# Patient Record
Sex: MALE | Race: WHITE | NOT HISPANIC OR LATINO | Employment: OTHER | URBAN - METROPOLITAN AREA
[De-identification: names, ages, dates, MRNs, and addresses within clinical notes are randomized per-mention and may not be internally consistent; named-entity substitution may affect disease eponyms.]

---

## 2023-10-12 DIAGNOSIS — M25.562 ACUTE PAIN OF LEFT KNEE: ICD-10-CM

## 2023-10-12 PROBLEM — M16.11 PRIMARY OSTEOARTHRITIS OF RIGHT HIP: Status: ACTIVE | Noted: 2023-10-12

## 2023-10-12 PROBLEM — M17.12 ARTHRITIS OF KNEE, LEFT: Status: ACTIVE | Noted: 2023-10-12

## 2023-10-12 PROBLEM — M25.551 RIGHT HIP PAIN: Status: ACTIVE | Noted: 2023-10-12

## 2023-10-12 RX ORDER — TRAMADOL HYDROCHLORIDE 50 MG/1
TABLET ORAL
COMMUNITY
Start: 2022-12-22 | End: 2023-12-14 | Stop reason: HOSPADM

## 2023-10-12 RX ORDER — ROSUVASTATIN CALCIUM 20 MG/1
TABLET, COATED ORAL
COMMUNITY
Start: 2023-09-01

## 2023-10-12 RX ORDER — BETAMETHASONE DIPROPIONATE 0.5 MG/G
OINTMENT TOPICAL
COMMUNITY
Start: 2019-09-17

## 2023-10-12 RX ORDER — PANTOPRAZOLE SODIUM 40 MG/1
TABLET, DELAYED RELEASE ORAL
COMMUNITY

## 2023-10-12 RX ORDER — METHYLPHENIDATE HYDROCHLORIDE 20 MG/1
TABLET ORAL
COMMUNITY
Start: 2023-09-13

## 2023-10-12 RX ORDER — HYALURONATE SODIUM 16.8MG/2ML
SYRINGE (ML) INTRAARTICULAR
COMMUNITY
Start: 2019-06-20 | End: 2023-12-14 | Stop reason: HOSPADM

## 2023-10-12 RX ORDER — DICLOFENAC SODIUM 75 MG/1
TABLET, DELAYED RELEASE ORAL
COMMUNITY
End: 2023-12-14 | Stop reason: HOSPADM

## 2023-10-12 RX ORDER — HYDROCODONE BITARTRATE AND ACETAMINOPHEN 5; 325 MG/1; MG/1
TABLET ORAL
COMMUNITY
Start: 2023-09-12 | End: 2023-12-14 | Stop reason: HOSPADM

## 2023-10-12 RX ORDER — CLOBETASOL PROPIONATE 0.5 MG/G
OINTMENT TOPICAL
COMMUNITY
Start: 2022-10-20

## 2023-10-12 RX ORDER — AMLODIPINE BESYLATE 5 MG/1
TABLET ORAL
COMMUNITY
Start: 2023-09-01

## 2023-10-12 RX ORDER — AMLODIPINE BESYLATE 2.5 MG/1
TABLET ORAL
COMMUNITY
Start: 2021-09-24 | End: 2023-12-14 | Stop reason: HOSPADM

## 2023-10-12 RX ORDER — OXYCODONE HYDROCHLORIDE 5 MG/1
TABLET ORAL
COMMUNITY
Start: 2022-12-22 | End: 2023-12-14 | Stop reason: HOSPADM

## 2023-10-12 RX ORDER — ROSUVASTATIN CALCIUM 10 MG/1
TABLET, COATED ORAL
COMMUNITY
Start: 2022-12-26 | End: 2023-12-14 | Stop reason: HOSPADM

## 2023-10-13 ENCOUNTER — ANCILLARY PROCEDURE (OUTPATIENT)
Dept: RADIOLOGY | Facility: CLINIC | Age: 68
End: 2023-10-13
Payer: COMMERCIAL

## 2023-10-13 ENCOUNTER — OFFICE VISIT (OUTPATIENT)
Dept: ORTHOPEDIC SURGERY | Facility: CLINIC | Age: 68
End: 2023-10-13
Payer: COMMERCIAL

## 2023-10-13 VITALS — BODY MASS INDEX: 32.93 KG/M2 | WEIGHT: 230 LBS | HEIGHT: 70 IN

## 2023-10-13 DIAGNOSIS — M17.12 ARTHRITIS OF LEFT KNEE: Primary | ICD-10-CM

## 2023-10-13 DIAGNOSIS — M25.562 ACUTE PAIN OF LEFT KNEE: ICD-10-CM

## 2023-10-13 DIAGNOSIS — Z01.818 PREOP TESTING: ICD-10-CM

## 2023-10-13 PROCEDURE — 73562 X-RAY EXAM OF KNEE 3: CPT | Mod: LT

## 2023-10-13 PROCEDURE — 99214 OFFICE O/P EST MOD 30 MIN: CPT | Performed by: ORTHOPAEDIC SURGERY

## 2023-10-13 PROCEDURE — 73562 X-RAY EXAM OF KNEE 3: CPT | Mod: LEFT SIDE | Performed by: RADIOLOGY

## 2023-10-13 RX ORDER — SODIUM CHLORIDE, SODIUM LACTATE, POTASSIUM CHLORIDE, CALCIUM CHLORIDE 600; 310; 30; 20 MG/100ML; MG/100ML; MG/100ML; MG/100ML
100 INJECTION, SOLUTION INTRAVENOUS CONTINUOUS
Status: CANCELLED | OUTPATIENT
Start: 2023-10-13

## 2023-10-13 NOTE — PROGRESS NOTES
LT KNEE PAIN    68-year-old is seen with left knee pain.  Has been having persistent severe sharp shooting pain in the left knee.  Has difficulty with standing and walking and going up and down stairs and getting up and down from a chair in and out of a car.  He has severe debilitating pain that he has had for many years despite extensive conservative treatment.  Including medication usage and avoidance of aggravating activities and physical therapy.    Pleasant and no acute distress. Walks with antalgic gait. Stands with varus alignment of the left knee and neutral on the right. Left knee range of motion is 5-110°. The knee is stable to varus and valgus stress Lachman and posterior drawer. There is a mild effusion. There is generalized tenderness. Right knee range of motion is 0-120°. There is no effusion. The knee is stable to varus and valgus stress Lachman and posterior drawer. Both lower extremities are well perfused the skin is intact and muscle tone is adequate.    Multiple x-ray views of the left knee are personally reviewed and these demonstrate advanced degenerative changes with complete loss of joint space and osteophyte formation and subchondral sclerosis and cystic change.    The patient has undergone extensive conservative treatment but has persistent severe debilitating pain and advanced degenerative changes on x-ray.  Treatment options including no treatment reviewed and the decision was made to proceed with left total knee arthroplasty.  The surgery and postoperative course were reviewed in detail.  Risks including but not limited to infection thromboembolus neurovascular injury fracture bleeding medical problems stiffness and implant failure or loosening were discussed and they understand this and have elected to proceed.  They will have medical clearance.  Avoidance of aggravating activities will be done in the meantime.  Intravenous TXA will be used at the time of the procedure.

## 2023-10-30 PROBLEM — M17.12 ARTHRITIS OF LEFT KNEE: Status: ACTIVE | Noted: 2023-10-13

## 2023-12-06 ENCOUNTER — PRE-ADMISSION TESTING (OUTPATIENT)
Dept: PREADMISSION TESTING | Facility: HOSPITAL | Age: 68
End: 2023-12-06
Payer: COMMERCIAL

## 2023-12-06 VITALS
TEMPERATURE: 96.8 F | DIASTOLIC BLOOD PRESSURE: 86 MMHG | RESPIRATION RATE: 20 BRPM | SYSTOLIC BLOOD PRESSURE: 143 MMHG | OXYGEN SATURATION: 97 % | HEIGHT: 70 IN | BODY MASS INDEX: 33.17 KG/M2 | WEIGHT: 231.7 LBS | HEART RATE: 63 BPM

## 2023-12-06 DIAGNOSIS — Z01.818 PREOP TESTING: Primary | ICD-10-CM

## 2023-12-06 DIAGNOSIS — M17.12 ARTHRITIS OF LEFT KNEE: ICD-10-CM

## 2023-12-06 DIAGNOSIS — M16.12 PRIMARY OSTEOARTHRITIS OF LEFT HIP: Primary | ICD-10-CM

## 2023-12-06 LAB
ABO GROUP (TYPE) IN BLOOD: NORMAL
ANION GAP SERPL CALC-SCNC: 12 MMOL/L (ref 10–20)
ANTIBODY SCREEN: NORMAL
APPEARANCE UR: CLEAR
APTT PPP: 31 SECONDS (ref 27–38)
BILIRUB UR STRIP.AUTO-MCNC: NEGATIVE MG/DL
BUN SERPL-MCNC: 16 MG/DL (ref 6–23)
CALCIUM SERPL-MCNC: 9.3 MG/DL (ref 8.6–10.3)
CHLORIDE SERPL-SCNC: 107 MMOL/L (ref 98–107)
CO2 SERPL-SCNC: 25 MMOL/L (ref 21–32)
COLOR UR: YELLOW
CREAT SERPL-MCNC: 1.06 MG/DL (ref 0.5–1.3)
ERYTHROCYTE [DISTWIDTH] IN BLOOD BY AUTOMATED COUNT: 13 % (ref 11.5–14.5)
GFR SERPL CREATININE-BSD FRML MDRD: 76 ML/MIN/1.73M*2
GLUCOSE SERPL-MCNC: 84 MG/DL (ref 74–99)
GLUCOSE UR STRIP.AUTO-MCNC: NEGATIVE MG/DL
HCT VFR BLD AUTO: 44.5 % (ref 41–52)
HGB BLD-MCNC: 15.1 G/DL (ref 13.5–17.5)
HOLD SPECIMEN: NORMAL
INR PPP: 1.1 (ref 0.9–1.1)
KETONES UR STRIP.AUTO-MCNC: NEGATIVE MG/DL
LEUKOCYTE ESTERASE UR QL STRIP.AUTO: NEGATIVE
MCH RBC QN AUTO: 30.5 PG (ref 26–34)
MCHC RBC AUTO-ENTMCNC: 33.9 G/DL (ref 32–36)
MCV RBC AUTO: 90 FL (ref 80–100)
NITRITE UR QL STRIP.AUTO: NEGATIVE
NRBC BLD-RTO: 0 /100 WBCS (ref 0–0)
PH UR STRIP.AUTO: 5 [PH]
PLATELET # BLD AUTO: 216 X10*3/UL (ref 150–450)
POTASSIUM SERPL-SCNC: 4.1 MMOL/L (ref 3.5–5.3)
PROT UR STRIP.AUTO-MCNC: NEGATIVE MG/DL
PROTHROMBIN TIME: 11.9 SECONDS (ref 9.8–12.8)
RBC # BLD AUTO: 4.95 X10*6/UL (ref 4.5–5.9)
RBC # UR STRIP.AUTO: NEGATIVE /UL
RH FACTOR (ANTIGEN D): NORMAL
SODIUM SERPL-SCNC: 140 MMOL/L (ref 136–145)
SP GR UR STRIP.AUTO: 1.02
UROBILINOGEN UR STRIP.AUTO-MCNC: <2 MG/DL
WBC # BLD AUTO: 6.2 X10*3/UL (ref 4.4–11.3)

## 2023-12-06 PROCEDURE — 85027 COMPLETE CBC AUTOMATED: CPT

## 2023-12-06 PROCEDURE — 87081 CULTURE SCREEN ONLY: CPT | Mod: PARLAB

## 2023-12-06 PROCEDURE — 36415 COLL VENOUS BLD VENIPUNCTURE: CPT

## 2023-12-06 PROCEDURE — 82565 ASSAY OF CREATININE: CPT

## 2023-12-06 PROCEDURE — 86900 BLOOD TYPING SEROLOGIC ABO: CPT

## 2023-12-06 PROCEDURE — 93005 ELECTROCARDIOGRAM TRACING: CPT

## 2023-12-06 PROCEDURE — 93010 ELECTROCARDIOGRAM REPORT: CPT | Performed by: INTERNAL MEDICINE

## 2023-12-06 PROCEDURE — 99203 OFFICE O/P NEW LOW 30 MIN: CPT | Performed by: NURSE PRACTITIONER

## 2023-12-06 PROCEDURE — 81003 URINALYSIS AUTO W/O SCOPE: CPT

## 2023-12-06 PROCEDURE — 85610 PROTHROMBIN TIME: CPT

## 2023-12-06 ASSESSMENT — PAIN - FUNCTIONAL ASSESSMENT: PAIN_FUNCTIONAL_ASSESSMENT: 0-10

## 2023-12-06 ASSESSMENT — DUKE ACTIVITY SCORE INDEX (DASI)
CAN YOU WALK INDOORS, SUCH AS AROUND YOUR HOUSE: YES
CAN YOU DO HEAVY WORK AROUND THE HOUSE LIKE SCRUBBING FLOORS OR LIFTING AND MOVING HEAVY FURNITURE: YES
CAN YOU HAVE SEXUAL RELATIONS: YES
CAN YOU CLIMB A FLIGHT OF STAIRS OR WALK UP A HILL: YES
CAN YOU RUN A SHORT DISTANCE: NO
CAN YOU WALK A BLOCK OR TWO ON LEVEL GROUND: YES
DASI METS SCORE: 8.9
CAN YOU DO MODERATE WORK AROUND THE HOUSE LIKE VACUUMING, SWEEPING FLOORS OR CARRYING GROCERIES: YES
TOTAL_SCORE: 50.2
CAN YOU DO YARD WORK LIKE RAKING LEAVES, WEEDING OR PUSHING A MOWER: YES
CAN YOU PARTICIPATE IN MODERATE RECREATIONAL ACTIVITIES LIKE GOLF, BOWLING, DANCING, DOUBLES TENNIS OR THROWING A BASEBALL OR FOOTBALL: YES
CAN YOU PARTICIPATE IN STRENOUS SPORTS LIKE SWIMMING, SINGLES TENNIS, FOOTBALL, BASKETBALL, OR SKIING: YES
CAN YOU TAKE CARE OF YOURSELF (EAT, DRESS, BATHE, OR USE TOILET): YES
CAN YOU DO LIGHT WORK AROUND THE HOUSE LIKE DUSTING OR WASHING DISHES: YES

## 2023-12-06 NOTE — PREPROCEDURE INSTRUCTIONS
Medication List            Accurate as of December 6, 2023  2:07 PM. Always use your most recent med list.                * amLODIPine 2.5 mg tablet  Commonly known as: Norvasc  Medication Adjustments for Surgery: Take morning of surgery with sip of water, no other fluids     * amLODIPine 5 mg tablet  Commonly known as: Norvasc     betamethasone dipropionate 0.05 % ointment  Commonly known as: Diprosone  Medication Adjustments for Surgery: Continue until night before surgery     clobetasol 0.05 % ointment  Commonly known as: Temovate  Medication Adjustments for Surgery: Continue until night before surgery     diclofenac 75 mg EC tablet  Commonly known as: Voltaren  Medication Adjustments for Surgery: Stop 7 days before surgery     Gelsyn-3 16.8 mg/2 mL injection  Generic drug: sodium hyaluronate     HYDROcodone-acetaminophen 5-325 mg tablet  Commonly known as: Norco  Medication Adjustments for Surgery: Continue until night before surgery     methylphenidate 20 mg tablet  Commonly known as: Ritalin  Medication Adjustments for Surgery: Continue until night before surgery     oxyCODONE 5 mg immediate release tablet  Commonly known as: Roxicodone     pantoprazole 40 mg EC tablet  Commonly known as: ProtoNix  Medication Adjustments for Surgery: Take morning of surgery with sip of water, no other fluids     rivaroxaban 10 mg tablet  Commonly known as: Xarelto  Take 1 tablet (10 mg) by mouth once daily for 12 days. To begin after surgery on 12/14  Medication Adjustments for Surgery: Other (Comment)  Notes to patient: Not taking     * rosuvastatin 10 mg tablet  Commonly known as: Crestor     * rosuvastatin 20 mg tablet  Commonly known as: Crestor  Medication Adjustments for Surgery: Continue until night before surgery     traMADol 50 mg tablet  Commonly known as: Ultram           * This list has 4 medication(s) that are the same as other medications prescribed for you. Read the directions carefully, and ask your doctor  or other care provider to review them with you.                                  NPO Instructions:    Do not eat any food after midnight the night before your surgery/procedure.    Additional Instructions:     Day of Surgery:  Review your medication instructions, take indicated medications  Wear  comfortable loose fitting clothing  Do not use moisturizers, creams, lotions or perfume    PRE-OPERATIVE INSTRUCTIONS FOR SURGERY    *Do not eat anything after midnight the night of surgery.  This includes food of any kind (including hard candy, cough drops, mints and gum) and beverages (including coffee and soda).  You may drink up to one 8 ounce glass of water up until three hours before your scheduled surgery time.    *One of our staff members will call you ONE business day before your surgery, between 11a-2p  To let you know the time to arrive.  If you have no received a call by 2 pm, call [phone]  *When you arrive at the hospital-->GO TO Registration on the ground floor  *Stop smoking 24 hours prior to surgery.  No Marijuana, CBD Oil or Vaping for 48 hours  *No alcohol 24 hours prior to surgery  *You will need a responsible adult to drive you home  -No acrylic nails or nail polish on at least one fingernail, NO polish on toes for foot surgery  -You may be asked to remove your dentures, partial plate, eyeglasses or contact lenses before going to surgery.  Please bring a case for these items.  -Body piercings need to be removed.  Jewelry and valuables should be left at home.  -Put on loose,  comfortable, clean clothing, that will accommodate bandages    HOME PREOPERATIVE ANTIBACTERIAL SHOWER:  -This shower is a way of cleaning the skin with germ killing solution before surgery.  The solution contains Chorhexidine (CHG).   Let your Dr. Know if you are allergic to Chlorhexidine.    NIGHT BEFORE SURGERY:  IF you are having a TOTAL joint replacement- YOU WILL SHOWER 2 NIGHTS PRIOR to surgery    -Take a normal shower and  wash your hair  -Turn OFF water to avoid rinsing the antibacterial skin cleanser off (CHG).  -Apply the CHG cleanser to a clean and wet washcloth.  Lather your entire body from the neck down.    -DO NOT USE ON THE HEAD, FACE, or GENITALS.  -RINSE immediately if CHG is in contact with your eyes, ears or mouth  -Gently wash your body.  Have the CHG cleanser stay on your skin for 3 MINUTES.  -After 3 minutes, turn on water and rinse the CHG cleanser off your body completely  -DO NOT WASH with regular soap after using CHG.  -PAT DRY with a clean fresh towel  -DO NOT apply any estrella, deodorants or lotions  -Dress in clean night cloths  **CHG cleanser will cause stains to fabrics if you wash them with bleach after use.     DAY OF SURGERY:  -Take shower-->JUST GET WET.  Turn OFF water.  -REPEAT the CHG cleanse as you did the night before.  -PAT DRY-->    What you may be asked to bring to surgery:  ___Crutches, walker  ___CPAP machine  ___Urine specimen

## 2023-12-06 NOTE — CPM/PAT H&P
"CPM/PAT Evaluation       Name: Maverick Foote (Maverick Foote)  /Age: 1955/68 y.o.     In-Person       Chief Complaint: Left knee OA    68 yr old male with c/o Left knee OA.  Reports ongoing progressive pain worsened by activity including walking, standing and stairs.  Pain is constant ache w/intermittent stiffness, knee instability, adding \"knee locks up, pops out\"; denies falls.  Has tried injections and physical therapy with no lasting relief.  Accompanied by spouse Gudelia who is attentive and supportive.  Reports prior Right knee replacement  and successful Right hip replacement December of last year. Reports remaining otherwise physically active, denies cardiac or respiratory symptoms.  Denies past issues with anesthesia.        No past medical history on file.    No past surgical history on file.    Patient  has no history on file for sexual activity.    Family History   Problem Relation Name Age of Onset    Cancer Mother         No Known Allergies    Prior to Admission medications    Medication Sig Start Date End Date Taking? Authorizing Provider   amLODIPine (Norvasc) 2.5 mg tablet TAKE 1 TABLET BY MOUTH EVERY DAY 21   Historical Provider, MD   amLODIPine (Norvasc) 5 mg tablet TAKE 1 TABLET BY MOUTH EVERY DAY 23   Historical Provider, MD   betamethasone dipropionate (Diprolene) 0.05 % ointment 1 Application 19   Historical Provider, MD   clobetasol (Temovate) 0.05 % ointment  10/20/22   Historical Provider, MD   diclofenac (Voltaren) 75 mg EC tablet TAKE 1 TABLET BY MOUTH TWICE DAILY WITH MEALS AS NEEDED    Historical Provider, MD   HYDROcodone-acetaminophen (Norco) 5-325 mg tablet TAKE 1 TABLET BY MOUTH EVERY DAY AS NEEDED FOR PAIN 23   Historical Provider, MD   methylphenidate (Ritalin) 20 mg tablet  23   Historical Provider, MD   oxyCODONE (Roxicodone) 5 mg immediate release tablet TAKE 1 -2 TABLETS BY MOUTH EVERY 4 -6 HOURS FOR 7 DAYS AS NEEDED FOR SEVERE PAIN. " 12/22/22   Historical Provider, MD   pantoprazole (ProtoNix) 40 mg EC tablet TAKE 1 TABLET BY MOUTH EVERY DAY    Historical Provider, MD   rivaroxaban (Xarelto) 10 mg tablet Take 1 tablet (10 mg) by mouth once daily for 12 days. To begin after surgery on 12/14 12/6/23 12/18/23  Mariluz Avalos APRN-CNS   rosuvastatin (Crestor) 10 mg tablet TAKE 2 TABLETS BY MOUTH DAILY AT BEDTIME 12/26/22   Historical Provider, MD   rosuvastatin (Crestor) 20 mg tablet TAKE 1 TABLET BY MOUTH DAILY AT BEDTIME 9/1/23   Historical Provider, MD   sodium hyaluronate (Gelsyn-3) 16.8 mg/2 mL injection INJECT ONE PREFILLED SYRINGE INTRA-ARTICULARLY INTO LEFT KNEE WEEKLY FOR THREE WEEKS, QTY 3 SYRINGES 6/20/19   Historical Provider, MD   traMADol (Ultram) 50 mg tablet TAKE 1 TABLET BY MOUTH EVERY 4 TO 6 HOURS FOR 7 DAYS AS NEEDED FOR MODERATE PAIN 12/22/22   Historical Provider, MD        Review of Systems    Constitutional: no fever, no chills and not feeling poorly.   Eyes: no eyesight problems.   ENT: no hearing loss, no nosebleeds and no sore throat.   Cardiovascular: no chest pain, no palpitations and no extremity edema.   Respiratory: no shortness of breath, no wheezing, no cough, reports sob with exertion.   Gastrointestinal: negative for abdominal pain, blood in stools or changes in bowel habits   Genitourinary: negative for dysuria, incontinence or changes in urinary habits   Musculoskeletal: no arthralgias and no gait abnormality.   Integumentary: negative for lesions, rash or itching.   Neurological: negative for confusion, dizziness, fainting or difficulty walking.   Psychiatric: not suicidal, no anxiety and no depression.   All other systems have been reviewed and are negative for complaint.     Physical Exam    General:      No acute distress, AOx3; appropriate mood and eye contact  HENT:      Head normal cephalic; mucous membranes moist & pink  Eyes:      Vision grossly intact, PERRLA, corrective lenses in use  Skin:      Warm,  dry & intact, no erythema or rashes noted  Respiratory:      Symmetric chest expansion, CTA, Room air  Cardiovascular:      RRR, S1, S2, no murmurs, rubs or gallops  Abdomen:      Soft, non-tender, BS+  Extremities:      No gross deformities; MORTENSEN x4; mild unsteady gait  Psychiatric:      Pleasant & cooperative, appropriate affect    PAT AIRWAY:   Airway:     Mallampati::  II    TM distance::  <3 FB    Neck ROM::  Full   upper dentures      Visit Vitals  /86   Pulse 63   Temp 36 °C (96.8 °F) (Temporal)   Resp 20       DASI Risk Score      Flowsheet Row Most Recent Value   DASI SCORE 50.2   METS Score (Will be calculated only when all the questions are answered) 8.9          Caprini DVT Assessment    No data to display       Modified Frailty Index    No data to display       CHADS2 Stroke Risk  Current as of 7 minutes ago        N/A 3 - 100%: High Risk   2 - 3%: Medium Risk   0 - 2%: Low Risk     Last Change: N/A          This score determines the patient's risk of having a stroke if the patient has atrial fibrillation.        This score is not applicable to this patient. Components are not calculated.          Revised Cardiac Risk Index    No data to display       Apfel Simplified Score    No data to display       Risk Analysis Index Results This Encounter    No data found in the last 1 encounters.       Stop Bang Score      Flowsheet Row Most Recent Value   Do you snore loudly? 0   Do you often feel tired or fatigued after your sleep? 0   Has anyone ever observed you stop breathing in your sleep? 0   Do you have or are you being treated for high blood pressure? 1   Recent BMI (Calculated) 33   Is BMI greater than 35 kg/m2? 0=No   Age older than 50 years old? 1=Yes   Is your neck circumference greater than 17 inches (Male) or 16 inches (Female)? 0   Gender - Male 1=Yes   STOP-BANG Total Score 3            Assessment and Plan:     Followed by ortho, Left knee OA    Medical clearance provided (Jani  12/4/2023)    Left total knee replacement w/Dr Ocampo on 12/13/2023

## 2023-12-07 LAB
BB ANTIBODY IDENTIFICATION: NORMAL
CASE #: NORMAL

## 2023-12-08 LAB — STAPHYLOCOCCUS SPEC CULT: NORMAL

## 2023-12-09 LAB
ATRIAL RATE: 67 BPM
P AXIS: 73 DEGREES
P OFFSET: 200 MS
P ONSET: 145 MS
PR INTERVAL: 154 MS
Q ONSET: 222 MS
QRS COUNT: 11 BEATS
QRS DURATION: 108 MS
QT INTERVAL: 398 MS
QTC CALCULATION(BAZETT): 420 MS
QTC FREDERICIA: 413 MS
R AXIS: 21 DEGREES
T AXIS: 20 DEGREES
T OFFSET: 421 MS
VENTRICULAR RATE: 67 BPM

## 2023-12-13 ENCOUNTER — APPOINTMENT (OUTPATIENT)
Dept: RADIOLOGY | Facility: HOSPITAL | Age: 68
End: 2023-12-13
Payer: COMMERCIAL

## 2023-12-13 ENCOUNTER — HOSPITAL ENCOUNTER (OUTPATIENT)
Facility: HOSPITAL | Age: 68
Discharge: HOME HEALTH | End: 2023-12-14
Attending: ORTHOPAEDIC SURGERY | Admitting: ORTHOPAEDIC SURGERY
Payer: COMMERCIAL

## 2023-12-13 ENCOUNTER — ANESTHESIA (OUTPATIENT)
Dept: OPERATING ROOM | Facility: HOSPITAL | Age: 68
End: 2023-12-13
Payer: COMMERCIAL

## 2023-12-13 ENCOUNTER — ANESTHESIA EVENT (OUTPATIENT)
Dept: OPERATING ROOM | Facility: HOSPITAL | Age: 68
End: 2023-12-13
Payer: COMMERCIAL

## 2023-12-13 DIAGNOSIS — M17.12 ARTHRITIS OF LEFT KNEE: ICD-10-CM

## 2023-12-13 PROBLEM — E78.5 HYPERLIPIDEMIA: Status: ACTIVE | Noted: 2023-12-13

## 2023-12-13 PROBLEM — I10 HTN (HYPERTENSION): Status: ACTIVE | Noted: 2023-12-13

## 2023-12-13 PROCEDURE — 2500000001 HC RX 250 WO HCPCS SELF ADMINISTERED DRUGS (ALT 637 FOR MEDICARE OP)

## 2023-12-13 PROCEDURE — C1776 JOINT DEVICE (IMPLANTABLE): HCPCS | Performed by: ORTHOPAEDIC SURGERY

## 2023-12-13 PROCEDURE — 2500000004 HC RX 250 GENERAL PHARMACY W/ HCPCS (ALT 636 FOR OP/ED): Performed by: ORTHOPAEDIC SURGERY

## 2023-12-13 PROCEDURE — 73560 X-RAY EXAM OF KNEE 1 OR 2: CPT | Mod: LEFT SIDE | Performed by: RADIOLOGY

## 2023-12-13 PROCEDURE — 27447 TOTAL KNEE ARTHROPLASTY: CPT

## 2023-12-13 PROCEDURE — C1713 ANCHOR/SCREW BN/BN,TIS/BN: HCPCS | Performed by: ORTHOPAEDIC SURGERY

## 2023-12-13 PROCEDURE — 2500000004 HC RX 250 GENERAL PHARMACY W/ HCPCS (ALT 636 FOR OP/ED): Performed by: NURSE ANESTHETIST, CERTIFIED REGISTERED

## 2023-12-13 PROCEDURE — 76942 ECHO GUIDE FOR BIOPSY: CPT | Performed by: ANESTHESIOLOGY

## 2023-12-13 PROCEDURE — 94760 N-INVAS EAR/PLS OXIMETRY 1: CPT

## 2023-12-13 PROCEDURE — 3600000005 HC OR TIME - INITIAL BASE CHARGE - PROCEDURE LEVEL FIVE: Performed by: ORTHOPAEDIC SURGERY

## 2023-12-13 PROCEDURE — 3700000001 HC GENERAL ANESTHESIA TIME - INITIAL BASE CHARGE: Performed by: ORTHOPAEDIC SURGERY

## 2023-12-13 PROCEDURE — 2500000005 HC RX 250 GENERAL PHARMACY W/O HCPCS: Performed by: ORTHOPAEDIC SURGERY

## 2023-12-13 PROCEDURE — 2720000007 HC OR 272 NO HCPCS: Performed by: ORTHOPAEDIC SURGERY

## 2023-12-13 PROCEDURE — A27447 PR TOTAL KNEE ARTHROPLASTY: Performed by: ANESTHESIOLOGY

## 2023-12-13 PROCEDURE — 7100000011 HC EXTENDED STAY RECOVERY HOURLY - NURSING UNIT

## 2023-12-13 PROCEDURE — 73560 X-RAY EXAM OF KNEE 1 OR 2: CPT | Mod: LT,FY

## 2023-12-13 PROCEDURE — 88311 DECALCIFY TISSUE: CPT | Performed by: STUDENT IN AN ORGANIZED HEALTH CARE EDUCATION/TRAINING PROGRAM

## 2023-12-13 PROCEDURE — 88305 TISSUE EXAM BY PATHOLOGIST: CPT | Performed by: STUDENT IN AN ORGANIZED HEALTH CARE EDUCATION/TRAINING PROGRAM

## 2023-12-13 PROCEDURE — 2500000004 HC RX 250 GENERAL PHARMACY W/ HCPCS (ALT 636 FOR OP/ED): Performed by: ANESTHESIOLOGY

## 2023-12-13 PROCEDURE — 27447 TOTAL KNEE ARTHROPLASTY: CPT | Performed by: ORTHOPAEDIC SURGERY

## 2023-12-13 PROCEDURE — 3600000010 HC OR TIME - EACH INCREMENTAL 1 MINUTE - PROCEDURE LEVEL FIVE: Performed by: ORTHOPAEDIC SURGERY

## 2023-12-13 PROCEDURE — 2500000004 HC RX 250 GENERAL PHARMACY W/ HCPCS (ALT 636 FOR OP/ED)

## 2023-12-13 PROCEDURE — 7100000002 HC RECOVERY ROOM TIME - EACH INCREMENTAL 1 MINUTE: Performed by: ORTHOPAEDIC SURGERY

## 2023-12-13 PROCEDURE — 2780000003 HC OR 278 NO HCPCS: Performed by: ORTHOPAEDIC SURGERY

## 2023-12-13 PROCEDURE — 7100000001 HC RECOVERY ROOM TIME - INITIAL BASE CHARGE: Performed by: ORTHOPAEDIC SURGERY

## 2023-12-13 PROCEDURE — 88311 DECALCIFY TISSUE: CPT | Mod: TC,SUR,PARLAB | Performed by: ORTHOPAEDIC SURGERY

## 2023-12-13 PROCEDURE — 88305 TISSUE EXAM BY PATHOLOGIST: CPT | Mod: TC,SUR,PARLAB | Performed by: ORTHOPAEDIC SURGERY

## 2023-12-13 PROCEDURE — 2500000001 HC RX 250 WO HCPCS SELF ADMINISTERED DRUGS (ALT 637 FOR MEDICARE OP): Performed by: ORTHOPAEDIC SURGERY

## 2023-12-13 PROCEDURE — A27447 PR TOTAL KNEE ARTHROPLASTY: Performed by: NURSE ANESTHETIST, CERTIFIED REGISTERED

## 2023-12-13 PROCEDURE — 3700000002 HC GENERAL ANESTHESIA TIME - EACH INCREMENTAL 1 MINUTE: Performed by: ORTHOPAEDIC SURGERY

## 2023-12-13 DEVICE — ATTUNE PATELLA MEDIALIZED DOME 35MM CEMENTED AOX
Type: IMPLANTABLE DEVICE | Site: KNEE | Status: FUNCTIONAL
Brand: ATTUNE

## 2023-12-13 DEVICE — ATTUNE KNEE SYSTEM TIBIAL INSERT FIXED BEARING CRUCIATE RETAINING 8 5MM AOX
Type: IMPLANTABLE DEVICE | Site: KNEE | Status: FUNCTIONAL
Brand: ATTUNE

## 2023-12-13 DEVICE — IMPLANTABLE DEVICE
Type: IMPLANTABLE DEVICE | Site: KNEE | Status: FUNCTIONAL
Brand: BIOMET® BONE CEMENT R

## 2023-12-13 DEVICE — ATTUNE KNEE SYSTEM FEMORAL CRUCIATE RETAINING SIZE 8 LEFT CEMENTED
Type: IMPLANTABLE DEVICE | Site: KNEE | Status: FUNCTIONAL
Brand: ATTUNE

## 2023-12-13 DEVICE — TIBAL BASE ATTUNE FB, SZ 7 CEM: Type: IMPLANTABLE DEVICE | Site: KNEE | Status: FUNCTIONAL

## 2023-12-13 RX ORDER — PROPOFOL 10 MG/ML
INJECTION, EMULSION INTRAVENOUS CONTINUOUS PRN
Status: DISCONTINUED | OUTPATIENT
Start: 2023-12-13 | End: 2023-12-13

## 2023-12-13 RX ORDER — SODIUM CHLORIDE, SODIUM LACTATE, POTASSIUM CHLORIDE, CALCIUM CHLORIDE 600; 310; 30; 20 MG/100ML; MG/100ML; MG/100ML; MG/100ML
100 INJECTION, SOLUTION INTRAVENOUS CONTINUOUS
Status: DISCONTINUED | OUTPATIENT
Start: 2023-12-13 | End: 2023-12-13 | Stop reason: HOSPADM

## 2023-12-13 RX ORDER — TRANEXAMIC ACID 10 MG/ML
1000 INJECTION, SOLUTION INTRAVENOUS
Status: COMPLETED | OUTPATIENT
Start: 2023-12-13 | End: 2023-12-13

## 2023-12-13 RX ORDER — SODIUM CHLORIDE, SODIUM LACTATE, POTASSIUM CHLORIDE, CALCIUM CHLORIDE 600; 310; 30; 20 MG/100ML; MG/100ML; MG/100ML; MG/100ML
100 INJECTION, SOLUTION INTRAVENOUS CONTINUOUS
Status: DISCONTINUED | OUTPATIENT
Start: 2023-12-13 | End: 2023-12-14 | Stop reason: HOSPADM

## 2023-12-13 RX ORDER — GABAPENTIN 300 MG/1
CAPSULE ORAL
Status: COMPLETED
Start: 2023-12-13 | End: 2023-12-13

## 2023-12-13 RX ORDER — PROMETHAZINE HYDROCHLORIDE 25 MG/ML
12.5 INJECTION, SOLUTION INTRAMUSCULAR; INTRAVENOUS ONCE AS NEEDED
Status: DISCONTINUED | OUTPATIENT
Start: 2023-12-13 | End: 2023-12-13 | Stop reason: HOSPADM

## 2023-12-13 RX ORDER — DIPHENHYDRAMINE HCL 12.5MG/5ML
12.5 LIQUID (ML) ORAL EVERY 6 HOURS PRN
Status: DISCONTINUED | OUTPATIENT
Start: 2023-12-13 | End: 2023-12-14 | Stop reason: HOSPADM

## 2023-12-13 RX ORDER — MIDAZOLAM HYDROCHLORIDE 1 MG/ML
INJECTION, SOLUTION INTRAMUSCULAR; INTRAVENOUS AS NEEDED
Status: DISCONTINUED | OUTPATIENT
Start: 2023-12-13 | End: 2023-12-13

## 2023-12-13 RX ORDER — NALOXONE HYDROCHLORIDE 1 MG/ML
0.2 INJECTION INTRAMUSCULAR; INTRAVENOUS; SUBCUTANEOUS EVERY 5 MIN PRN
Status: DISCONTINUED | OUTPATIENT
Start: 2023-12-13 | End: 2023-12-14 | Stop reason: HOSPADM

## 2023-12-13 RX ORDER — LABETALOL HYDROCHLORIDE 5 MG/ML
5 INJECTION, SOLUTION INTRAVENOUS ONCE AS NEEDED
Status: DISCONTINUED | OUTPATIENT
Start: 2023-12-13 | End: 2023-12-13 | Stop reason: HOSPADM

## 2023-12-13 RX ORDER — PROPOFOL 10 MG/ML
INJECTION, EMULSION INTRAVENOUS AS NEEDED
Status: DISCONTINUED | OUTPATIENT
Start: 2023-12-13 | End: 2023-12-13

## 2023-12-13 RX ORDER — MIDAZOLAM HYDROCHLORIDE 1 MG/ML
INJECTION, SOLUTION INTRAMUSCULAR; INTRAVENOUS
Status: DISPENSED
Start: 2023-12-13 | End: 2023-12-14

## 2023-12-13 RX ORDER — DOCUSATE SODIUM 100 MG/1
100 CAPSULE, LIQUID FILLED ORAL 2 TIMES DAILY
Status: DISCONTINUED | OUTPATIENT
Start: 2023-12-13 | End: 2023-12-14 | Stop reason: HOSPADM

## 2023-12-13 RX ORDER — MIDAZOLAM HYDROCHLORIDE 1 MG/ML
1 INJECTION, SOLUTION INTRAMUSCULAR; INTRAVENOUS ONCE AS NEEDED
Status: DISCONTINUED | OUTPATIENT
Start: 2023-12-13 | End: 2023-12-13 | Stop reason: HOSPADM

## 2023-12-13 RX ORDER — PHENYLEPHRINE HCL IN 0.9% NACL 1 MG/10 ML
SYRINGE (ML) INTRAVENOUS AS NEEDED
Status: DISCONTINUED | OUTPATIENT
Start: 2023-12-13 | End: 2023-12-13

## 2023-12-13 RX ORDER — PROMETHAZINE HYDROCHLORIDE 25 MG/1
25 TABLET ORAL EVERY 6 HOURS PRN
Status: DISCONTINUED | OUTPATIENT
Start: 2023-12-13 | End: 2023-12-14 | Stop reason: HOSPADM

## 2023-12-13 RX ORDER — DIPHENHYDRAMINE HYDROCHLORIDE 50 MG/ML
12.5 INJECTION INTRAMUSCULAR; INTRAVENOUS ONCE AS NEEDED
Status: DISCONTINUED | OUTPATIENT
Start: 2023-12-13 | End: 2023-12-13 | Stop reason: HOSPADM

## 2023-12-13 RX ORDER — CEFAZOLIN SODIUM 2 G/100ML
2 INJECTION, SOLUTION INTRAVENOUS EVERY 8 HOURS
Status: COMPLETED | OUTPATIENT
Start: 2023-12-13 | End: 2023-12-14

## 2023-12-13 RX ORDER — MEPERIDINE HYDROCHLORIDE 25 MG/ML
12.5 INJECTION INTRAMUSCULAR; INTRAVENOUS; SUBCUTANEOUS EVERY 10 MIN PRN
Status: DISCONTINUED | OUTPATIENT
Start: 2023-12-13 | End: 2023-12-13 | Stop reason: HOSPADM

## 2023-12-13 RX ORDER — CEFAZOLIN SODIUM 2 G/100ML
INJECTION, SOLUTION INTRAVENOUS
Status: COMPLETED
Start: 2023-12-13 | End: 2023-12-13

## 2023-12-13 RX ORDER — ACETAMINOPHEN 325 MG/1
650 TABLET ORAL ONCE
Status: COMPLETED | OUTPATIENT
Start: 2023-12-13 | End: 2023-12-13

## 2023-12-13 RX ORDER — OXYCODONE HYDROCHLORIDE 5 MG/1
2.5 TABLET ORAL EVERY 4 HOURS PRN
Status: DISCONTINUED | OUTPATIENT
Start: 2023-12-13 | End: 2023-12-14 | Stop reason: HOSPADM

## 2023-12-13 RX ORDER — CEFAZOLIN SODIUM 2 G/100ML
2 INJECTION, SOLUTION INTRAVENOUS EVERY 8 HOURS
Status: DISCONTINUED | OUTPATIENT
Start: 2023-12-13 | End: 2023-12-13 | Stop reason: HOSPADM

## 2023-12-13 RX ORDER — ACETAMINOPHEN 325 MG/1
650 TABLET ORAL EVERY 6 HOURS
Status: DISCONTINUED | OUTPATIENT
Start: 2023-12-13 | End: 2023-12-14 | Stop reason: HOSPADM

## 2023-12-13 RX ORDER — OXYCODONE HYDROCHLORIDE 5 MG/1
5 TABLET ORAL EVERY 6 HOURS PRN
Status: DISCONTINUED | OUTPATIENT
Start: 2023-12-13 | End: 2023-12-14 | Stop reason: HOSPADM

## 2023-12-13 RX ORDER — CYCLOBENZAPRINE HCL 10 MG
10 TABLET ORAL 3 TIMES DAILY PRN
Status: DISCONTINUED | OUTPATIENT
Start: 2023-12-13 | End: 2023-12-14 | Stop reason: HOSPADM

## 2023-12-13 RX ORDER — ONDANSETRON HYDROCHLORIDE 2 MG/ML
4 INJECTION, SOLUTION INTRAVENOUS ONCE AS NEEDED
Status: DISCONTINUED | OUTPATIENT
Start: 2023-12-13 | End: 2023-12-13 | Stop reason: HOSPADM

## 2023-12-13 RX ORDER — ONDANSETRON 4 MG/1
4 TABLET, FILM COATED ORAL EVERY 8 HOURS PRN
Status: DISCONTINUED | OUTPATIENT
Start: 2023-12-13 | End: 2023-12-14 | Stop reason: HOSPADM

## 2023-12-13 RX ORDER — KETOROLAC TROMETHAMINE 30 MG/ML
15 INJECTION, SOLUTION INTRAMUSCULAR; INTRAVENOUS EVERY 6 HOURS PRN
Status: DISCONTINUED | OUTPATIENT
Start: 2023-12-13 | End: 2023-12-14 | Stop reason: HOSPADM

## 2023-12-13 RX ORDER — ONDANSETRON HYDROCHLORIDE 2 MG/ML
4 INJECTION, SOLUTION INTRAVENOUS EVERY 8 HOURS PRN
Status: DISCONTINUED | OUTPATIENT
Start: 2023-12-13 | End: 2023-12-14 | Stop reason: HOSPADM

## 2023-12-13 RX ORDER — PROMETHAZINE HYDROCHLORIDE 25 MG/1
25 SUPPOSITORY RECTAL EVERY 12 HOURS PRN
Status: DISCONTINUED | OUTPATIENT
Start: 2023-12-13 | End: 2023-12-14 | Stop reason: HOSPADM

## 2023-12-13 RX ORDER — HYDROMORPHONE HYDROCHLORIDE 1 MG/ML
1 INJECTION, SOLUTION INTRAMUSCULAR; INTRAVENOUS; SUBCUTANEOUS EVERY 5 MIN PRN
Status: DISCONTINUED | OUTPATIENT
Start: 2023-12-13 | End: 2023-12-13 | Stop reason: HOSPADM

## 2023-12-13 RX ORDER — ACETAMINOPHEN 325 MG/1
TABLET ORAL
Status: COMPLETED
Start: 2023-12-13 | End: 2023-12-13

## 2023-12-13 RX ORDER — POLYETHYLENE GLYCOL 3350 17 G/17G
17 POWDER, FOR SOLUTION ORAL DAILY
Status: DISCONTINUED | OUTPATIENT
Start: 2023-12-14 | End: 2023-12-14 | Stop reason: HOSPADM

## 2023-12-13 RX ORDER — HYDRALAZINE HYDROCHLORIDE 20 MG/ML
5 INJECTION INTRAMUSCULAR; INTRAVENOUS EVERY 30 MIN PRN
Status: DISCONTINUED | OUTPATIENT
Start: 2023-12-13 | End: 2023-12-13 | Stop reason: HOSPADM

## 2023-12-13 RX ORDER — FENTANYL CITRATE 50 UG/ML
INJECTION, SOLUTION INTRAMUSCULAR; INTRAVENOUS
Status: DISPENSED
Start: 2023-12-13 | End: 2023-12-14

## 2023-12-13 RX ORDER — ACETAMINOPHEN 325 MG/1
650 TABLET ORAL EVERY 4 HOURS PRN
Status: DISCONTINUED | OUTPATIENT
Start: 2023-12-13 | End: 2023-12-13 | Stop reason: HOSPADM

## 2023-12-13 RX ORDER — GABAPENTIN 300 MG/1
300 CAPSULE ORAL ONCE
Status: COMPLETED | OUTPATIENT
Start: 2023-12-13 | End: 2023-12-13

## 2023-12-13 RX ORDER — ACETAMINOPHEN 325 MG/1
650 TABLET ORAL EVERY 6 HOURS SCHEDULED
Status: DISCONTINUED | OUTPATIENT
Start: 2023-12-14 | End: 2023-12-13

## 2023-12-13 RX ORDER — OXYCODONE HYDROCHLORIDE 5 MG/1
10 TABLET ORAL EVERY 4 HOURS PRN
Status: DISCONTINUED | OUTPATIENT
Start: 2023-12-13 | End: 2023-12-14 | Stop reason: HOSPADM

## 2023-12-13 RX ADMIN — Medication 100 MCG: at 15:07

## 2023-12-13 RX ADMIN — PROPOFOL 100 MCG/KG/MIN: 10 INJECTION, EMULSION INTRAVENOUS at 14:10

## 2023-12-13 RX ADMIN — Medication 100 MCG: at 14:26

## 2023-12-13 RX ADMIN — Medication 100 MCG: at 14:55

## 2023-12-13 RX ADMIN — MEPERIDINE HYDROCHLORIDE 12.5 MG: 25 INJECTION INTRAMUSCULAR; INTRAVENOUS; SUBCUTANEOUS at 16:55

## 2023-12-13 RX ADMIN — TRANEXAMIC ACID 1000 MG: 10 INJECTION, SOLUTION INTRAVENOUS at 15:49

## 2023-12-13 RX ADMIN — DOCUSATE SODIUM 100 MG: 100 CAPSULE, LIQUID FILLED ORAL at 22:55

## 2023-12-13 RX ADMIN — Medication 150 MCG: at 14:43

## 2023-12-13 RX ADMIN — Medication 100 MCG: at 14:34

## 2023-12-13 RX ADMIN — GABAPENTIN 300 MG: 300 CAPSULE ORAL at 11:26

## 2023-12-13 RX ADMIN — Medication 200 MCG: at 16:01

## 2023-12-13 RX ADMIN — OXYCODONE HYDROCHLORIDE 10 MG: 5 TABLET ORAL at 21:33

## 2023-12-13 RX ADMIN — PROPOFOL 50 MG: 10 INJECTION, EMULSION INTRAVENOUS at 14:15

## 2023-12-13 RX ADMIN — CEFAZOLIN SODIUM 2 G: 2 INJECTION, SOLUTION INTRAVENOUS at 14:12

## 2023-12-13 RX ADMIN — MEPERIDINE HYDROCHLORIDE 12.5 MG: 25 INJECTION INTRAMUSCULAR; INTRAVENOUS; SUBCUTANEOUS at 17:07

## 2023-12-13 RX ADMIN — MIDAZOLAM 2 MG: 1 INJECTION INTRAMUSCULAR; INTRAVENOUS at 14:00

## 2023-12-13 RX ADMIN — SODIUM CHLORIDE, SODIUM LACTATE, POTASSIUM CHLORIDE, AND CALCIUM CHLORIDE 100 ML/HR: 600; 310; 30; 20 INJECTION, SOLUTION INTRAVENOUS at 11:27

## 2023-12-13 RX ADMIN — SODIUM CHLORIDE, POTASSIUM CHLORIDE, SODIUM LACTATE AND CALCIUM CHLORIDE 100 ML/HR: 600; 310; 30; 20 INJECTION, SOLUTION INTRAVENOUS at 22:55

## 2023-12-13 RX ADMIN — CEFAZOLIN SODIUM 2 G: 2 INJECTION, SOLUTION INTRAVENOUS at 22:53

## 2023-12-13 RX ADMIN — Medication 150 MCG: at 14:46

## 2023-12-13 RX ADMIN — TRANEXAMIC ACID 1000 MG: 10 INJECTION, SOLUTION INTRAVENOUS at 14:13

## 2023-12-13 RX ADMIN — Medication 2 L/MIN: at 21:15

## 2023-12-13 RX ADMIN — SODIUM CHLORIDE, POTASSIUM CHLORIDE, SODIUM LACTATE AND CALCIUM CHLORIDE 100 ML/HR: 600; 310; 30; 20 INJECTION, SOLUTION INTRAVENOUS at 16:45

## 2023-12-13 RX ADMIN — Medication 100 MCG: at 14:39

## 2023-12-13 RX ADMIN — Medication 100 MCG: at 15:52

## 2023-12-13 RX ADMIN — HYDROMORPHONE HYDROCHLORIDE 0.5 MG: 1 INJECTION, SOLUTION INTRAMUSCULAR; INTRAVENOUS; SUBCUTANEOUS at 22:58

## 2023-12-13 RX ADMIN — Medication 150 MCG: at 15:28

## 2023-12-13 RX ADMIN — ONDANSETRON 4 MG: 2 INJECTION INTRAMUSCULAR; INTRAVENOUS at 21:33

## 2023-12-13 RX ADMIN — Medication 150 MCG: at 15:40

## 2023-12-13 RX ADMIN — Medication 100 MCG: at 14:57

## 2023-12-13 RX ADMIN — SODIUM CHLORIDE, SODIUM LACTATE, POTASSIUM CHLORIDE, AND CALCIUM CHLORIDE: 600; 310; 30; 20 INJECTION, SOLUTION INTRAVENOUS at 14:49

## 2023-12-13 RX ADMIN — ACETAMINOPHEN 650 MG: 325 TABLET ORAL at 22:54

## 2023-12-13 RX ADMIN — HYDROMORPHONE HYDROCHLORIDE 1 MG: 1 INJECTION, SOLUTION INTRAMUSCULAR; INTRAVENOUS; SUBCUTANEOUS at 18:23

## 2023-12-13 RX ADMIN — ACETAMINOPHEN 650 MG: 325 TABLET ORAL at 11:25

## 2023-12-13 RX ADMIN — Medication 100 MCG: at 14:31

## 2023-12-13 RX ADMIN — Medication 100 MCG: at 15:19

## 2023-12-13 RX ADMIN — Medication 100 MCG: at 15:48

## 2023-12-13 SDOH — SOCIAL STABILITY: SOCIAL INSECURITY: ARE THERE ANY APPARENT SIGNS OF INJURIES/BEHAVIORS THAT COULD BE RELATED TO ABUSE/NEGLECT?: NO

## 2023-12-13 SDOH — SOCIAL STABILITY: SOCIAL INSECURITY: HAS ANYONE EVER THREATENED TO HURT YOUR FAMILY OR YOUR PETS?: NO

## 2023-12-13 SDOH — SOCIAL STABILITY: SOCIAL INSECURITY: DO YOU FEEL UNSAFE GOING BACK TO THE PLACE WHERE YOU ARE LIVING?: NO

## 2023-12-13 SDOH — SOCIAL STABILITY: SOCIAL INSECURITY: WERE YOU ABLE TO COMPLETE ALL THE BEHAVIORAL HEALTH SCREENINGS?: YES

## 2023-12-13 SDOH — SOCIAL STABILITY: SOCIAL INSECURITY: DO YOU FEEL ANYONE HAS EXPLOITED OR TAKEN ADVANTAGE OF YOU FINANCIALLY OR OF YOUR PERSONAL PROPERTY?: NO

## 2023-12-13 SDOH — SOCIAL STABILITY: SOCIAL INSECURITY: HAVE YOU HAD THOUGHTS OF HARMING ANYONE ELSE?: NO

## 2023-12-13 SDOH — SOCIAL STABILITY: SOCIAL INSECURITY: ARE YOU OR HAVE YOU BEEN THREATENED OR ABUSED PHYSICALLY, EMOTIONALLY, OR SEXUALLY BY ANYONE?: NO

## 2023-12-13 SDOH — HEALTH STABILITY: MENTAL HEALTH: CURRENT SMOKER: 0

## 2023-12-13 SDOH — SOCIAL STABILITY: SOCIAL INSECURITY: ABUSE: ADULT

## 2023-12-13 SDOH — SOCIAL STABILITY: SOCIAL INSECURITY: DOES ANYONE TRY TO KEEP YOU FROM HAVING/CONTACTING OTHER FRIENDS OR DOING THINGS OUTSIDE YOUR HOME?: NO

## 2023-12-13 ASSESSMENT — COLUMBIA-SUICIDE SEVERITY RATING SCALE - C-SSRS
6. HAVE YOU EVER DONE ANYTHING, STARTED TO DO ANYTHING, OR PREPARED TO DO ANYTHING TO END YOUR LIFE?: NO
1. IN THE PAST MONTH, HAVE YOU WISHED YOU WERE DEAD OR WISHED YOU COULD GO TO SLEEP AND NOT WAKE UP?: NO
2. HAVE YOU ACTUALLY HAD ANY THOUGHTS OF KILLING YOURSELF?: NO

## 2023-12-13 ASSESSMENT — ACTIVITIES OF DAILY LIVING (ADL)
LACK_OF_TRANSPORTATION: NO
FEEDING YOURSELF: INDEPENDENT
JUDGMENT_ADEQUATE_SAFELY_COMPLETE_DAILY_ACTIVITIES: YES
TOILETING: NEEDS ASSISTANCE
BATHING: INDEPENDENT
DRESSING YOURSELF: INDEPENDENT
HEARING - RIGHT EAR: FUNCTIONAL
ASSISTIVE_DEVICE: WALKER
HEARING - LEFT EAR: FUNCTIONAL
ADEQUATE_TO_COMPLETE_ADL: YES
PATIENT'S MEMORY ADEQUATE TO SAFELY COMPLETE DAILY ACTIVITIES?: YES
WALKS IN HOME: NEEDS ASSISTANCE
GROOMING: INDEPENDENT

## 2023-12-13 ASSESSMENT — PAIN SCALES - GENERAL
PAINLEVEL_OUTOF10: 9
PAINLEVEL_OUTOF10: 0 - NO PAIN
PAINLEVEL_OUTOF10: 7
PAINLEVEL_OUTOF10: 0 - NO PAIN
PAIN_LEVEL: 0
PAINLEVEL_OUTOF10: 6
PAINLEVEL_OUTOF10: 0 - NO PAIN
PAINLEVEL_OUTOF10: 8
PAINLEVEL_OUTOF10: 0 - NO PAIN

## 2023-12-13 ASSESSMENT — PAIN - FUNCTIONAL ASSESSMENT
PAIN_FUNCTIONAL_ASSESSMENT: 0-10

## 2023-12-13 ASSESSMENT — COGNITIVE AND FUNCTIONAL STATUS - GENERAL
HELP NEEDED FOR BATHING: A LITTLE
CLIMB 3 TO 5 STEPS WITH RAILING: A LITTLE
DRESSING REGULAR LOWER BODY CLOTHING: A LITTLE
TURNING FROM BACK TO SIDE WHILE IN FLAT BAD: A LITTLE
MOBILITY SCORE: 18
TOILETING: A LITTLE
DAILY ACTIVITIY SCORE: 21
STANDING UP FROM CHAIR USING ARMS: A LITTLE
PATIENT BASELINE BEDBOUND: NO
MOVING TO AND FROM BED TO CHAIR: A LITTLE
WALKING IN HOSPITAL ROOM: A LITTLE
MOVING FROM LYING ON BACK TO SITTING ON SIDE OF FLAT BED WITH BEDRAILS: A LITTLE

## 2023-12-13 ASSESSMENT — PATIENT HEALTH QUESTIONNAIRE - PHQ9
1. LITTLE INTEREST OR PLEASURE IN DOING THINGS: NOT AT ALL
SUM OF ALL RESPONSES TO PHQ9 QUESTIONS 1 & 2: 0
2. FEELING DOWN, DEPRESSED OR HOPELESS: NOT AT ALL

## 2023-12-13 ASSESSMENT — LIFESTYLE VARIABLES
AUDIT-C TOTAL SCORE: 0
AUDIT-C TOTAL SCORE: 0
SKIP TO QUESTIONS 9-10: 1
HOW OFTEN DO YOU HAVE A DRINK CONTAINING ALCOHOL: NEVER
HOW OFTEN DO YOU HAVE 6 OR MORE DRINKS ON ONE OCCASION: NEVER
HOW MANY STANDARD DRINKS CONTAINING ALCOHOL DO YOU HAVE ON A TYPICAL DAY: PATIENT DOES NOT DRINK

## 2023-12-13 ASSESSMENT — PAIN DESCRIPTION - LOCATION: LOCATION: KNEE

## 2023-12-13 ASSESSMENT — PAIN DESCRIPTION - ORIENTATION: ORIENTATION: LEFT

## 2023-12-13 NOTE — ANESTHESIA POSTPROCEDURE EVALUATION
Patient: Maverick Foote    Procedure Summary       Date: 12/13/23 Room / Location: [facility] 07 / [facility]    Anesthesia Start: 1358 Anesthesia Stop: 1639    Procedure: LEFT TOTAL KNEE REPLACEMENT (Left: Knee) Diagnosis:       Arthritis of left knee      (Arthritis of left knee [M17.12])    Surgeons: Perry Ocampo MD Responsible Provider: Fer Madrid MD    Anesthesia Type: regional, spinal ASA Status: 2            Anesthesia Type: regional, spinal    Vitals Value Taken Time   /80 12/13/23 1638   Temp 36.3 °C (97.3 °F) 12/13/23 1638   Pulse 72 12/13/23 1638   Resp 16 12/13/23 1638   SpO2 97 % 12/13/23 1638       Anesthesia Post Evaluation    Patient location during evaluation: PACU  Patient participation: complete - patient participated  Level of consciousness: awake  Pain score: 0  Pain management: adequate  Airway patency: patent  Cardiovascular status: acceptable  Respiratory status: acceptable  Hydration status: acceptable  Postoperative Nausea and Vomiting: none        There were no known notable events for this encounter.

## 2023-12-13 NOTE — OP NOTE
LEFT TOTAL KNEE REPLACEMENT (L) Operative Note     Date: 2023  OR Location: [facility]    Name: Maverick Foote, : 1955, Age: 68 y.o., MRN: [id number], Sex: male    Diagnosis  Pre-op Diagnosis     * Arthritis of left knee [M17.12] Post-op Diagnosis     * Arthritis of left knee [M17.12]     Procedures  LEFT TOTAL KNEE REPLACEMENT  62464 - [facility] ARTHRP KNE CONDYLE&PLATU MEDIAL&LAT COMPARTMENTS  DePuy attune femur size 8, tibia size 7, 5 mm polyethylene, 35 mm patella    Surgeons      * Perry Ocampo - Primary    Resident/Fellow/Other Assistant:  Surgeon(s) and Role: Haley Hendrix CNP, RNFA, Edward Gawryszewski    Procedure Summary  Anesthesia: Consult  ASA: II  Anesthesia Staff: Anesthesiologist: Fer Madrid MD  CRNA: MELLY Allen-CRNA  C-AA: SONIA Sanabria  Estimated Blood Loss: 25mL    Indications:  Patient has undergone extensive conservative treatment, but has peristant severe sharp shooting pain and difficulty with standing and walking.  Radiographs demonstrate severe degenerative changes with loss of joint space and osteophyte formation and subchondral sclerosis and cystic change.  Treatment options including no treatment were reviewed and the decision was made to proceed with surgery.    FARAZ Dc CNP was required as a surgical assistant to provide retraction for adequate and safe exposure, to assist with securing cutting guides as they were held in place by operating surgeon, to assist in patient leg positioning throughout the procedure, assist with trial implant placement and trial reduction and range of motion testing, assist with final implant placement and assist with wound closure.  She was involved in the performance of the entire procedure.  No qualified resident was available.    Description of procedure: The patient was brought into the operating room.  Anesthesia was performed.  The patient was positioned and prepped and draped in the usual fashion.  After  Esmarch exsanguination the tourniquet was inflated.  A longitudinal midline incision was made.  Medial parapatellar arthrotomy was performed.  Medial periosteal release was done.  There was advanced degenerative disease involving the knee.  Remaining medial and lateral menisci were removed.  ACL was removed.  Notch osteophytes were removed.  Entry into the femoral canal was done and the intramedullary guide was placed.  The distal femoral cut was made.  The femur was sized.  The cutting block was applied and then the cuts were made.  Peripheral osteophytes were removed.  Attention was turned to the tibia and the external alignment guide was used and the tibial cut was made.  Posterior osteophytes were removed.  Trialing was then done and the implants were selected.  The decision was made to resurface the patella and with an oscillating saw the patellar cut was made to remove a similar amount of bone as the patellar implant.  The patellar lug drills were drilled and the patella was trialed and tracked well.  The femoral lug drills were drilled and tibia was prepared with the appropriate punches.  The bone cuts were pulse irrigated and then well dried.  The tibial implant was cemented.  The polyethylene was impacted on and then the femoral implant was cemented followed by the patellar implant.  Extruding cement was removed and the cement was allowed to harden.  The wound was copiously irrigated with antibiotic irrigation.  Hemostasis was carefully obtained.  The knee was stable throughout a full range of motion and the patella tracked well.  The arthrotomy was closed with #1 Vicryl and flexion against gravity was 120 degrees following closure.  The remainder of the wound was closed in layers and a sterile dressing was applied.  The patient tolerated the procedure well and was taken to the recovery room in stable condition.  Estimated blood loss was 25 cc.  The bone was sent for specimen.  There were no complications.   The patient received the appropriate preoperative antibiotic within 1 hour of the skin incision and antibiotics were ordered postoperatively to be completed within 24 hours.  DVT prophylaxis was ordered to start within 24 hours.  The patient received 1 g of tranexamic acid intravenous at the start and at the end of the procedure.    Intra-op Medications:   Medication Name Total Dose   ceFAZolin in dextrose (iso-os) (Ancef) IVPB 2 g 2 g   lactated Ringer's infusion 18.33 mL   tranexamic acid in NaCl,iso-os 1,000 mg/100 mL (10 mg/mL) IV 1,000 mg 1,000 mg   ceFAZolin in dextrose (iso-os) (Ancef) IVPB  - Omnicell Override Pull Cannot be calculated              Anesthesia Record               Intraprocedure I/O Totals          Intake    Propofol Drip 0.00 mL    The total shown is the total volume documented since Anesthesia Start was filed.    lactated Ringer's infusion 1000.00 mL    ceFAZolin in dextrose (iso-os) (Ancef) IVPB 2 g 100.00 mL    tranexamic acid in NaCl,iso-os 1,000 mg/100 mL (10 mg/mL) IV 1,000 mg 200.00 mL    Total Intake 1300 mL          Specimen:   ID Type Source Tests Collected by Time   1 : Left Knee Bone and Tissue Tissue KNEE ARTHROPLASTY LEFT SURGICAL PATHOLOGY EXAM Perry Ocampo MD 12/13/2023 1494        Staff:   Circulator: Lana Forbes RN  Relief Circulator: Sheri Crespo RN  Relief Scrub: Angelica Aranda  Scrub Person: Bria Lopez    Tourniquet Times:     Total Tourniquet Time Documented:  Thigh (Left) - 74 minutes  Total: Thigh (Left) - 74 minutes      Implants:  Implants       Type Name Action Serial No.      Joint Knee CEMENT, BONE, BIOMET R, 1X40 - [id number] Implanted      Joint Knee CEMENT, BONE, BIOMET R, 1X40 - [id number] Implanted      Joint Knee TIBAL BASE ATTUNE FB, SZ 7 MARTY - [id number] Implanted      Joint Knee INSERT, ATTUNE CR FB, SZ 8, 5MM - [id number] Implanted      Joint Knee FEMORAL, ATTUNE CR, MARTY, SZ 8, LT - [id number] Implanted      Joint Knee DOME, PATELLA, MEDIALIZED, 35MM -  [id number] Implanted               Attending Attestation: I performed the procedure.    Perry Ocampo  Phone Number: [phone]

## 2023-12-13 NOTE — ANESTHESIA PROCEDURE NOTES
Spinal Block    Patient location during procedure: OR  Start time: 12/13/2023 2:04 PM  End time: 12/13/2023 2:08 PM  Reason for block: primary anesthetic  Staffing  Performed: CRNA and attending   Authorized by: Humberto Serra MD    Performed by: MAINE Allen    Preanesthetic Checklist  Completed: patient identified, IV checked, site marked, risks and benefits discussed, surgical consent, monitors and equipment checked, pre-op evaluation, timeout performed and sterile techniques followed  Block Timeout  RN/Licensed healthcare professional reads aloud to the Anesthesia provider and entire team: Patient identity, procedure with side and site, patient position, and as applicable the availability of implants/special equipment/special requirements.  Patient on coagulant treatment: no  Timeout performed at: 12/13/2023 2:03 PM  Spinal Block  Patient position: sitting  Prep: Betadine  Sterility prep: cap, drape, gloves and mask  Sedation level: light sedation  Patient monitoring: blood pressure, continuous pulse oximetry and heart rate  Approach: midline  Vertebral space: L2-3  Injection technique: single-shot  Needle  Needle type: Elv   Needle gauge: 22 G  Needle length: 3.5 in  Free flowing CSF: yes    Assessment  Sensory level: T6 bilateral  Block outcome: patient comfortable  Procedure assessment: patient tolerated procedure well with no immediate complications  Additional Notes  Attempt x2 by CRNA, attempt x2 by MD. Pt tolerated well.

## 2023-12-13 NOTE — ANESTHESIA PREPROCEDURE EVALUATION
Patient: Maverick Foote    Procedure Information       Date/Time: 12/13/23 1230    Procedure: LEFT TOTAL KNEE REPLACEMENT (Left: Knee)    Location: [facility] / [facility]    Surgeons: Perry Ocampo MD            Relevant Problems   Anesthesia (within normal limits)      Cardiovascular   (+) HTN (hypertension)   (+) Hyperlipidemia      Musculoskeletal   (+) Primary osteoarthritis of right hip      Other   (+) Arthritis of knee, left   (+) Arthritis of left knee       Clinical information reviewed:   Tobacco  Allergies  Meds   Med Hx  Surg Hx   Fam Hx          NPO Detail:  NPO/Void Status  Date of Last Liquid: 12/12/23  Time of Last Liquid: 2300  Date of Last Solid: 12/12/23  Time of Last Solid: 2000         Physical Exam    Airway  Mallampati: II  TM distance: >3 FB  Neck ROM: full     Cardiovascular - normal exam     Dental   (+) upper dentures     Pulmonary - normal exam     Abdominal            Anesthesia Plan    ASA 2     regional and spinal     The patient is not a current smoker.    intravenous induction   Anesthetic plan and risks discussed with patient.    Plan discussed with CRNA.

## 2023-12-13 NOTE — ANESTHESIA PROCEDURE NOTES
Peripheral Block    Patient location during procedure: pre-op  Start time: 12/13/2023 12:25 PM  End time: 12/13/2023 12:35 PM  Reason for block: at surgeon's request and post-op pain management  Staffing  Performed: attending   Authorized by: Humberto Serra MD    Performed by: Humberto Serra MD  Preanesthetic Checklist  Completed: patient identified, IV checked, site marked, risks and benefits discussed, surgical consent, monitors and equipment checked, pre-op evaluation and timeout performed   Timeout performed at: 12/13/2023 12:25 PM  Peripheral Block  Patient position: laying flat  Prep: ChloraPrep  Patient monitoring: heart rate, cardiac monitor and continuous pulse ox  Block type: adductor canal  Injection technique: single-shot  Guidance: ultrasound guided  Local infiltration: ropivacaine  Infiltration strength: 0.5 %  Dose: 30 mL  Needle  Needle gauge: 20 G  Needle localization: ultrasound guidance  Test dose: negative  Assessment  Injection assessment: negative aspiration for heme, no paresthesia on injection, incremental injection and local visualized surrounding nerve on ultrasound  Paresthesia pain: none  Heart rate change: no  Slow fractionated injection: yes

## 2023-12-14 ENCOUNTER — HOME HEALTH ADMISSION (OUTPATIENT)
Dept: HOME HEALTH SERVICES | Facility: HOME HEALTH | Age: 68
End: 2023-12-14
Payer: COMMERCIAL

## 2023-12-14 VITALS
HEIGHT: 70 IN | TEMPERATURE: 100 F | WEIGHT: 231.48 LBS | RESPIRATION RATE: 18 BRPM | OXYGEN SATURATION: 93 % | HEART RATE: 95 BPM | SYSTOLIC BLOOD PRESSURE: 135 MMHG | BODY MASS INDEX: 33.14 KG/M2 | DIASTOLIC BLOOD PRESSURE: 76 MMHG

## 2023-12-14 LAB
ANION GAP SERPL CALC-SCNC: 14 MMOL/L (ref 10–20)
BUN SERPL-MCNC: 12 MG/DL (ref 6–23)
CALCIUM SERPL-MCNC: 8.6 MG/DL (ref 8.6–10.3)
CHLORIDE SERPL-SCNC: 100 MMOL/L (ref 98–107)
CO2 SERPL-SCNC: 26 MMOL/L (ref 21–32)
CREAT SERPL-MCNC: 0.88 MG/DL (ref 0.5–1.3)
ERYTHROCYTE [DISTWIDTH] IN BLOOD BY AUTOMATED COUNT: 12.6 % (ref 11.5–14.5)
GFR SERPL CREATININE-BSD FRML MDRD: >90 ML/MIN/1.73M*2
GLUCOSE SERPL-MCNC: 139 MG/DL (ref 74–99)
HCT VFR BLD AUTO: 40.3 % (ref 41–52)
HGB BLD-MCNC: 13.4 G/DL (ref 13.5–17.5)
MCH RBC QN AUTO: 30.6 PG (ref 26–34)
MCHC RBC AUTO-ENTMCNC: 33.3 G/DL (ref 32–36)
MCV RBC AUTO: 92 FL (ref 80–100)
NRBC BLD-RTO: 0 /100 WBCS (ref 0–0)
PLATELET # BLD AUTO: 244 X10*3/UL (ref 150–450)
POTASSIUM SERPL-SCNC: 3.8 MMOL/L (ref 3.5–5.3)
RBC # BLD AUTO: 4.38 X10*6/UL (ref 4.5–5.9)
SODIUM SERPL-SCNC: 136 MMOL/L (ref 136–145)
WBC # BLD AUTO: 7.4 X10*3/UL (ref 4.4–11.3)

## 2023-12-14 PROCEDURE — 2500000001 HC RX 250 WO HCPCS SELF ADMINISTERED DRUGS (ALT 637 FOR MEDICARE OP): Performed by: CLINICAL NURSE SPECIALIST

## 2023-12-14 PROCEDURE — 97116 GAIT TRAINING THERAPY: CPT | Mod: CQ,GP

## 2023-12-14 PROCEDURE — 97535 SELF CARE MNGMENT TRAINING: CPT | Mod: CQ,GP

## 2023-12-14 PROCEDURE — 36415 COLL VENOUS BLD VENIPUNCTURE: CPT | Performed by: ORTHOPAEDIC SURGERY

## 2023-12-14 PROCEDURE — 99221 1ST HOSP IP/OBS SF/LOW 40: CPT | Performed by: NURSE PRACTITIONER

## 2023-12-14 PROCEDURE — 85027 COMPLETE CBC AUTOMATED: CPT | Performed by: ORTHOPAEDIC SURGERY

## 2023-12-14 PROCEDURE — 97165 OT EVAL LOW COMPLEX 30 MIN: CPT | Mod: GO

## 2023-12-14 PROCEDURE — 97535 SELF CARE MNGMENT TRAINING: CPT | Mod: CO,GO

## 2023-12-14 PROCEDURE — 97161 PT EVAL LOW COMPLEX 20 MIN: CPT | Mod: GP

## 2023-12-14 PROCEDURE — 99231 SBSQ HOSP IP/OBS SF/LOW 25: CPT | Performed by: CLINICAL NURSE SPECIALIST

## 2023-12-14 PROCEDURE — 2500000001 HC RX 250 WO HCPCS SELF ADMINISTERED DRUGS (ALT 637 FOR MEDICARE OP): Performed by: ORTHOPAEDIC SURGERY

## 2023-12-14 PROCEDURE — 2500000004 HC RX 250 GENERAL PHARMACY W/ HCPCS (ALT 636 FOR OP/ED): Performed by: ORTHOPAEDIC SURGERY

## 2023-12-14 PROCEDURE — 7100000011 HC EXTENDED STAY RECOVERY HOURLY - NURSING UNIT

## 2023-12-14 PROCEDURE — 97110 THERAPEUTIC EXERCISES: CPT | Mod: CQ,GP

## 2023-12-14 PROCEDURE — 80048 BASIC METABOLIC PNL TOTAL CA: CPT | Performed by: ORTHOPAEDIC SURGERY

## 2023-12-14 PROCEDURE — 2500000001 HC RX 250 WO HCPCS SELF ADMINISTERED DRUGS (ALT 637 FOR MEDICARE OP): Performed by: NURSE PRACTITIONER

## 2023-12-14 RX ORDER — TRAMADOL HYDROCHLORIDE 50 MG/1
50 TABLET ORAL EVERY 6 HOURS PRN
Qty: 28 TABLET | Refills: 0 | Status: SHIPPED | OUTPATIENT
Start: 2023-12-14 | End: 2023-12-21

## 2023-12-14 RX ORDER — POLYETHYLENE GLYCOL 3350 17 G/17G
17 POWDER, FOR SOLUTION ORAL DAILY PRN
Start: 2023-12-14

## 2023-12-14 RX ORDER — AMLODIPINE BESYLATE 5 MG/1
5 TABLET ORAL DAILY
Status: SHIPPED | OUTPATIENT
Start: 2023-12-14

## 2023-12-14 RX ORDER — ACETAMINOPHEN 325 MG/1
1000 TABLET ORAL EVERY 8 HOURS
Start: 2023-12-14

## 2023-12-14 RX ORDER — METHYLPHENIDATE HYDROCHLORIDE 5 MG/1
20 TABLET ORAL
Status: DISCONTINUED | OUTPATIENT
Start: 2023-12-14 | End: 2023-12-14 | Stop reason: HOSPADM

## 2023-12-14 RX ORDER — TRAMADOL HYDROCHLORIDE 50 MG/1
50 TABLET ORAL EVERY 6 HOURS
Status: DISCONTINUED | OUTPATIENT
Start: 2023-12-14 | End: 2023-12-14 | Stop reason: HOSPADM

## 2023-12-14 RX ORDER — DOCUSATE SODIUM 100 MG/1
100 CAPSULE, LIQUID FILLED ORAL DAILY
Start: 2023-12-14

## 2023-12-14 RX ORDER — ROSUVASTATIN CALCIUM 10 MG/1
20 TABLET, COATED ORAL NIGHTLY
Status: DISCONTINUED | OUTPATIENT
Start: 2023-12-14 | End: 2023-12-14 | Stop reason: HOSPADM

## 2023-12-14 RX ORDER — AMLODIPINE BESYLATE 5 MG/1
5 TABLET ORAL DAILY
Status: DISCONTINUED | OUTPATIENT
Start: 2023-12-14 | End: 2023-12-14 | Stop reason: HOSPADM

## 2023-12-14 RX ORDER — OXYCODONE HYDROCHLORIDE 5 MG/1
5 TABLET ORAL EVERY 6 HOURS PRN
Qty: 28 TABLET | Refills: 0 | Status: SHIPPED | OUTPATIENT
Start: 2023-12-14 | End: 2023-12-21

## 2023-12-14 RX ORDER — PANTOPRAZOLE SODIUM 40 MG/1
40 TABLET, DELAYED RELEASE ORAL DAILY
Status: DISCONTINUED | OUTPATIENT
Start: 2023-12-14 | End: 2023-12-14 | Stop reason: HOSPADM

## 2023-12-14 RX ADMIN — ACETAMINOPHEN 650 MG: 325 TABLET ORAL at 04:06

## 2023-12-14 RX ADMIN — CEFAZOLIN SODIUM 2 G: 2 INJECTION, SOLUTION INTRAVENOUS at 04:06

## 2023-12-14 RX ADMIN — TRAMADOL HYDROCHLORIDE 50 MG: 50 TABLET, COATED ORAL at 09:38

## 2023-12-14 RX ADMIN — RIVAROXABAN 10 MG: 10 TABLET, FILM COATED ORAL at 09:38

## 2023-12-14 RX ADMIN — POLYETHYLENE GLYCOL 3350 17 G: 17 POWDER, FOR SOLUTION ORAL at 09:39

## 2023-12-14 RX ADMIN — AMLODIPINE BESYLATE 5 MG: 5 TABLET ORAL at 09:38

## 2023-12-14 RX ADMIN — DOCUSATE SODIUM 100 MG: 100 CAPSULE, LIQUID FILLED ORAL at 09:38

## 2023-12-14 RX ADMIN — PANTOPRAZOLE SODIUM 40 MG: 40 TABLET, DELAYED RELEASE ORAL at 09:38

## 2023-12-14 RX ADMIN — OXYCODONE HYDROCHLORIDE 10 MG: 5 TABLET ORAL at 04:05

## 2023-12-14 RX ADMIN — CYCLOBENZAPRINE 10 MG: 10 TABLET, FILM COATED ORAL at 04:06

## 2023-12-14 RX ADMIN — HYDROMORPHONE HYDROCHLORIDE 0.5 MG: 1 INJECTION, SOLUTION INTRAMUSCULAR; INTRAVENOUS; SUBCUTANEOUS at 05:31

## 2023-12-14 RX ADMIN — ACETAMINOPHEN 650 MG: 325 TABLET ORAL at 10:31

## 2023-12-14 RX ADMIN — OXYCODONE HYDROCHLORIDE 10 MG: 5 TABLET ORAL at 10:30

## 2023-12-14 ASSESSMENT — COGNITIVE AND FUNCTIONAL STATUS - GENERAL
HELP NEEDED FOR BATHING: A LITTLE
STANDING UP FROM CHAIR USING ARMS: A LITTLE
WALKING IN HOSPITAL ROOM: A LITTLE
MOVING FROM LYING ON BACK TO SITTING ON SIDE OF FLAT BED WITH BEDRAILS: A LITTLE
STANDING UP FROM CHAIR USING ARMS: A LITTLE
TOILETING: A LOT
DAILY ACTIVITIY SCORE: 21
TOILETING: A LITTLE
DRESSING REGULAR LOWER BODY CLOTHING: A LOT
MOBILITY SCORE: 18
HELP NEEDED FOR BATHING: A LITTLE
TURNING FROM BACK TO SIDE WHILE IN FLAT BAD: A LITTLE
MOBILITY SCORE: 20
MOBILITY SCORE: 18
MOVING TO AND FROM BED TO CHAIR: A LITTLE
WALKING IN HOSPITAL ROOM: A LITTLE
WALKING IN HOSPITAL ROOM: A LITTLE
DAILY ACTIVITIY SCORE: 17
CLIMB 3 TO 5 STEPS WITH RAILING: A LITTLE
MOVING FROM LYING ON BACK TO SITTING ON SIDE OF FLAT BED WITH BEDRAILS: A LITTLE
TOILETING: A LITTLE
MOVING TO AND FROM BED TO CHAIR: A LITTLE
TURNING FROM BACK TO SIDE WHILE IN FLAT BAD: A LITTLE
DRESSING REGULAR UPPER BODY CLOTHING: A LITTLE
STANDING UP FROM CHAIR USING ARMS: A LITTLE
DAILY ACTIVITIY SCORE: 21
HELP NEEDED FOR BATHING: A LOT
DRESSING REGULAR LOWER BODY CLOTHING: A LITTLE
MOVING TO AND FROM BED TO CHAIR: A LITTLE
DRESSING REGULAR LOWER BODY CLOTHING: A LITTLE

## 2023-12-14 ASSESSMENT — ACTIVITIES OF DAILY LIVING (ADL): HOME_MANAGEMENT_TIME_ENTRY: 69

## 2023-12-14 ASSESSMENT — PAIN DESCRIPTION - ORIENTATION
ORIENTATION: LEFT
ORIENTATION: RIGHT

## 2023-12-14 ASSESSMENT — PAIN SCALES - GENERAL
PAINLEVEL_OUTOF10: 3
PAINLEVEL_OUTOF10: 10 - WORST POSSIBLE PAIN
PAINLEVEL_OUTOF10: 3
PAINLEVEL_OUTOF10: 6
PAINLEVEL_OUTOF10: 7
PAINLEVEL_OUTOF10: 8
PAINLEVEL_OUTOF10: 5 - MODERATE PAIN
PAINLEVEL_OUTOF10: 7

## 2023-12-14 ASSESSMENT — PAIN - FUNCTIONAL ASSESSMENT
PAIN_FUNCTIONAL_ASSESSMENT: 0-10

## 2023-12-14 ASSESSMENT — PAIN DESCRIPTION - LOCATION
LOCATION: KNEE
LOCATION: KNEE

## 2023-12-14 NOTE — DISCHARGE INSTRUCTIONS
PAIN MANAGEMENT AT HOME:  To provide the best pain control over the next few days when pain will be at it's worst, we suggest you continue to take the following medications routinely and separately to provide the best pain management.  In addition, apply ice VERY FREQUENTLY to incision.   Also use some type of alternative intervention such as music therapy, relaxation techniques, or an type of diversion (such as watching television or talking to a friend) to help control pain.         ROUTINE MEDICATIONS:            TAKE TRAMADOL EVERY 6 HOURS               TAKE TYLENOL EVERY 8 HOURS     In between, take oxycodone as needed for breakthrough pain.  DO NOT TAKE OXYCODONE AND TRAMADOL AT SAME TIME!!!!    Also, do NOT take more than 4000mg of acetaminophen (tylenol) in 24 hours.        CONSTIPATION MANAGEMENT AT HOME:  Constipation is common after Joint Replacement surgery for several reasons.  A common side effect of all pain medication is constipation.  A decrease in your activity as well as change in your normal diet & appetite all contribute to the constipation.  At home, it is important to take a daily stool softener such as Colace, Milk of magnesium or senokot while you are taking pain medications to help manage the constipation.  In addition, if you do NOT have a bowel movement within 72 hours of discharge, add a laxative such as miralax.  Miralax normally takes 2 to 3 days to work so you will not receive immediate results.  It is also important to drink plenty of fluids, especially water.  Stool softeners & laxatives need water to work properly.  We also suggest you increase your fiber intake either with foods high in fiber or with some type of supplement such as benefiber or metamucil.    Foods that are high in fiber include:     Fruits such as pears, strawberries, avocado, apples, raspberries, bananas, kiwis   Vegetables such as carrots, beets, broccoli, brussel sprouts, spinach   Lentils and kidney beans    Split peas and chickpeas   Oats, almonds, coretta seeds, and sweet potatoes      ACTIVITY AT HOME:  You will need to continue with your therapy after discharge either with in home therapy or at an outpatient facility.  Most patients initially do in home therapy for about two weeks then transition to outpatient therapy.  You have freedom of choice in which in home therapy and outpatient facility you plan to use.  Most in home therapy sessions will begin within 24 to 48 hours after discharge.  After about two weeks of in home therapy, you will most likely to ready for outpatient therapy sessions.  Outpatient therapy is normally twice a week for weeks.  While you are at home it is important that you perform the exercises the therapist went over with you in the hospital 2 to 3 times a day.  After you complete your exercises, apply ice to your incision to help with swelling and pain.  You should also be getting up and walking around your house every hour with the use of your walker.  While at rest, perform, ankle pumps on each foot at least ten times.  This will help with the swelling as well as decrease your risk for blood clots.  ALWAYS USE YOUR WALKER WITH ANY TYPE OF ACTIVITY!!!!    WOUND CARE:  The bandage on your incision will stay in place for 7 days.  The bandage is waterproof so you may shower with the bandage in place.  No need to wrap or cover it.  Some drainage on your bandage is perfectly normal.  Home health care will assist you with bandage removal.  Once the bandage is removed, your incision can be left open to air if there is no drainage present.  If your incision is draining at the time of bandage removal, home health care will assist you with applying a new gauze bandage.  Gauze bandages are NOT waterproof.    Swelling in your operative extremity will peak about 72 hours after surgery and can last for weeks.  To help with the swelling make sure you are elevating your leg and apply ice frequently.  In  addition, you will receive compression stockings upon discharge from the hospital.  Make sure you are wearing these stockings on both your legs at home.  Generally we instruct you to wear during the day and remove at bedtime for the next 4 weeks.      *Apply ice to incision at least 5 times daily    *Wear bilateral scarlett hose stockings to lower extremities for next 4 weeks    *Do NOT take any non steroidal anti-inflammatory medications such as motrin, aleve, ibuprofen, celebrex or meloxicam    *Take daily stool softener.  If you experience any diarrhea, stop medication    *If you need a refill on your pain medication, contact your surgeon's office Monday through Thursday with as least 24 hours notice    If you have any questions or concerns please contact the Joint  Mariluz Avalos at:  Office: [phone]   Cell:  [phone]  Email:  [email or web address]

## 2023-12-14 NOTE — CARE PLAN
Problem: Pain  Goal: Takes deep breaths with improved pain control throughout the shift  Outcome: Progressing  Goal: Turns in bed with improved pain control throughout the shift  Outcome: Progressing  Goal: Walks with improved pain control throughout the shift  Outcome: Progressing  Goal: Performs ADL's with improved pain control throughout shift  Outcome: Progressing  Goal: Participates in PT with improved pain control throughout the shift  Outcome: Progressing  Goal: Free from opioid side effects throughout the shift  Outcome: Progressing  Goal: Free from acute confusion related to pain meds throughout the shift  Outcome: Progressing   The patient's goals for the shift include         9

## 2023-12-14 NOTE — PROGRESS NOTES
"Maverick Foote is a 68 y.o. male on day 0 of admission presenting with Arthritis of left knee.    Subjective   Patient c/o moderate amount of incisional pain  Also c/o slight nausea but able to tolerate PO intake        Objective     Physical Exam  Aquacel dressing D&I to left knee incision  Neurovascular status WNL LLE  Moderate amount of edema present to LLE     Last Recorded Vitals  Blood pressure (!) 140/96, pulse (!) 113, temperature 36.8 °C (98.2 °F), resp. rate 18, height 1.778 m (5' 10\"), weight 105 kg (231 lb 7.7 oz), SpO2 92 %.  Intake/Output last 3 Shifts:  I/O last 3 completed shifts:  In: 3628.3 (34.6 mL/kg) [I.V.:3028.3 (28.8 mL/kg); IV Piggyback:600]  Out: 800 (7.6 mL/kg) [Urine:700 (0.2 mL/kg/hr); Blood:100]  Weight: 105 kg     Relevant Results      Scheduled medications  acetaminophen, 650 mg, oral, q6h  docusate sodium, 100 mg, oral, BID  methylphenidate, 20 mg, oral, BID AC  pantoprazole, 40 mg, oral, Daily  polyethylene glycol, 17 g, oral, Daily  rivaroxaban, 10 mg, oral, Daily  rosuvastatin, 20 mg, oral, Nightly      Continuous medications  lactated Ringer's, 100 mL/hr, Last Rate: Stopped (12/13/23 1645)  lactated Ringer's, 100 mL/hr, Last Rate: 100 mL/hr (12/13/23 2255)  oxygen, 2 L/min      PRN medications  PRN medications: cyclobenzaprine, diphenhydrAMINE, HYDROmorphone, HYDROmorphone, ketorolac, naloxone, ondansetron **OR** ondansetron, oxyCODONE, oxyCODONE, oxyCODONE, promethazine **OR** promethazine  No results found for this or any previous visit (from the past 24 hour(s)).                         Assessment/Plan   Principal Problem:    Arthritis of left knee  Active Problems:    Arthritis of knee, left    HTN (hypertension)    Hyperlipidemia     Continue therapy PT/OT with 100% WBS.  Plan discharge home with [facility] later today if patient safe and medically stable  Begin xarelto 10mg daily this am.  Bilateral SCDs to be worn while patient in bed.  Encouraged ankle pumps Q1 WA.  Increase " patient's activity as tolerated.  Continue routine tylenol & ice therapy with PRN oxycodone.  Will add routine tramadol as peripheral block wears off and with increased activity  Patient's hemoglobin level stable at 13.4.  BMP results pending.  Will monitor            MELLY Lopes-CNS

## 2023-12-14 NOTE — SIGNIFICANT EVENT
Discussed discharge plan with patient.  For discharge home with [facility] today.  Patient has no preference in which [facility] agency he utilizes and is aware referral will be placed to [facility] for SOC tomorrow.  Patient agreeable.    Reviewed discharge instructions with patient & pt's wife.  Patient verbalizes understanding of activity, wound care, home going medications, pain management, and follow up care.  Aquacel instruction sheet, Pain medication administration form, and TJR zone form provided to patient.

## 2023-12-14 NOTE — PROGRESS NOTES
Physical Therapy    Physical Therapy Treatment    Patient Name: Maverick Foote  MRN: [id number]  Today's Date: 12/14/2023  Time Calculation  Start Time: 1046  Stop Time: 1131  Time Calculation (min): 45 min       Assessment/Plan         PT Plan  Treatment/Interventions: Bed mobility, Transfer training, Gait training, Stair training, Strengthening, Range of motion  PT Plan: Skilled PT  PT Frequency: Daily  PT Discharge Recommendations: Low intensity level of continued care (prn assist)  Equipment Recommended upon Discharge:  (use of rw on flats, sc/hr  on steps)  PT - OK to Discharge: Yes      General Visit Information:   PT  Visit  PT Received On: 12/14/23       Subjective               Objective                    Treatments:  Therapeutic Exercise  Therapeutic Exercise Performed:  (supine rle ap's, qs,hs, saqs, slrs with assist , laqs   x 20 reps ea   r knee arom 0-80 degrees   cold pack post rx to r knee)    Bed Mobility  Bed Mobility:  (supine to sit sba)    Ambulation/Gait Training  Ambulation/Gait Training Performed:  (ambulated 40 and 20 feet using fixed wheeled walker cga)  Transfers  Transfer:  (sit to stand sba)    Stairs  Stairs:  (ascended/descended 4 steps using 2 rails cga     ascended/descended 4 steps using one rail and stnd cane cga)    Outcome Measures:  [facility] Basic Mobility  Turning from your back to your side while in a flat bed without using bedrails: A little  Moving from lying on your back to sitting on the side of a flat bed without using bedrails: A little  Moving to and from bed to chair (including a wheelchair): A little  Standing up from a chair using your arms (e.g. wheelchair or bedside chair): A little  To walk in hospital room: A little  Climbing 3-5 steps with railing: A little  Basic Mobility - Total Score: 18    Education Documentation  Handouts, taught by Germaine Kennedy PTA at 12/14/2023  2:45 PM.  Learner: Patient  Readiness: Acceptance  Method: Demonstration  Response:  Demonstrated Understanding    Precautions, taught by Germaine Kennedy PTA at 12/14/2023  2:45 PM.  Learner: Patient  Readiness: Acceptance  Method: Demonstration  Response: Demonstrated Understanding    Home Exercise Program, taught by Germaine Kennedy PTA at 12/14/2023  2:45 PM.  Learner: Patient  Readiness: Acceptance  Method: Demonstration  Response: Demonstrated Understanding    Mobility Training, taught by Germaine Kennedy PTA at 12/14/2023  2:45 PM.  Learner: Patient  Readiness: Acceptance  Method: Demonstration  Response: Demonstrated Understanding    Precautions, taught by Germaine Kennedy PTA at 12/14/2023  2:45 PM.  Learner: Patient  Readiness: Acceptance  Method: Demonstration  Response: Demonstrated Understanding    Education Comments  No comments found.             Encounter Problems       Encounter Problems (Active)       PT Problem       PT Goal 1 (Progressing)       Start:  12/14/23    Expected End:  12/15/23       Indep txs and bed mob          PT Goal 2 (Progressing)       Start:  12/14/23    Expected End:  12/15/23       Mod indep/ s  w/rw 150ft x4         PT Goal 3 (Progressing)       Start:  12/14/23    Expected End:  12/15/23       Tkr 0-100 degrees,          PT Goal 4 (Progressing)       Start:  12/14/23    Expected End:  12/15/23       20-30 reps tkr there ex         PT Goal 5 (Progressing)       Start:  12/14/23    Expected End:  12/15/23       4 steps w/ hr/straight cand and cga of 12               Encounter Problems (Resolved)       Pain - Adult

## 2023-12-14 NOTE — CONSULTS
"  Inpatient consult to Medicine  Consult performed by: MELLY Cabezas-CNP  Consult ordered by: Perry Ocampo MD  Reason for consult: medical management        History Of Present Illness  Maverick Foote is a 68 y.o. male with hx of osteoarthritis who is POD #1 LEFT TOTAL KNEE REPLACEMENT. He is OOB in the chair, his pain is 6/10, he worked with therapy and tolerated it well. He denies pain and SOB, no LLE neuropathy (says it's \"humming\"). Remainder of ROS reviewed and negative except as indicated in HPI.     Past Medical History  He has a past medical history of ADHD (attention deficit hyperactivity disorder), Clotting disorder (CMS/HCC), GERD (gastroesophageal reflux disease), HLD (hyperlipidemia), and Hypertension.    Surgical History  Social History     Tobacco Use    Smoking status: Former     Packs/day: 2.00     Years: 30.00     Additional pack years: 0.00     Total pack years: 60.00     Types: Cigarettes     Quit date: 2001     Years since quittin.8    Smokeless tobacco: Never   Vaping Use    Vaping Use: Never used   Substance Use Topics    Alcohol use: Yes     Alcohol/week: 3.0 standard drinks of alcohol     Types: 3 Cans of beer per week    Drug use: Defer       Social History  He reports that he quit smoking about 22 years ago. His smoking use included cigarettes. He has a 60.00 pack-year smoking history. He has never used smokeless tobacco. He reports current alcohol use of about 3.0 standard drinks of alcohol per week. Drug use questions deferred to the physician.    Family History  Family History   Problem Relation Name Age of Onset    Cancer Mother      Heart disease Mother      Peripheral vascular disease Father          amputation of leg    Diabetes Brother      Coronary artery disease Brother          CABGx3       Allergies  Patient has no known allergies.    Vitals:    23 0744   BP: (!) 140/96   Pulse: (!) 113   Resp: 18   Temp: 36.8 °C (98.2 °F)   SpO2: 92%       Vitals: "    12/13/23 1854   Weight: 105 kg (231 lb 7.7 oz)       Scheduled medications  acetaminophen, 650 mg, oral, q6h  docusate sodium, 100 mg, oral, BID  methylphenidate, 20 mg, oral, BID AC  pantoprazole, 40 mg, oral, Daily  polyethylene glycol, 17 g, oral, Daily  rivaroxaban, 10 mg, oral, Daily  rosuvastatin, 20 mg, oral, Nightly  traMADol, 50 mg, oral, q6h      Continuous medications  lactated Ringer's, 100 mL/hr, Last Rate: Stopped (12/13/23 1645)  lactated Ringer's, 100 mL/hr, Last Rate: 100 mL/hr (12/13/23 2255)  oxygen, 2 L/min      PRN medications  PRN medications: cyclobenzaprine, diphenhydrAMINE, HYDROmorphone, HYDROmorphone, ketorolac, naloxone, ondansetron **OR** ondansetron, oxyCODONE, oxyCODONE, oxyCODONE, promethazine **OR** promethazine    Results for orders placed or performed during the hospital encounter of 12/13/23 (from the past 24 hour(s))   CBC   Result Value Ref Range    WBC 7.4 4.4 - 11.3 x10*3/uL    nRBC 0.0 0.0 - 0.0 /100 WBCs    RBC 4.38 (L) 4.50 - 5.90 x10*6/uL    Hemoglobin 13.4 (L) 13.5 - 17.5 g/dL    Hematocrit 40.3 (L) 41.0 - 52.0 %    MCV 92 80 - 100 fL    MCH 30.6 26.0 - 34.0 pg    MCHC 33.3 32.0 - 36.0 g/dL    RDW 12.6 11.5 - 14.5 %    Platelets 244 150 - 450 x10*3/uL   Basic metabolic panel   Result Value Ref Range    Glucose 139 (H) 74 - 99 mg/dL    Sodium 136 136 - 145 mmol/L    Potassium 3.8 3.5 - 5.3 mmol/L    Chloride 100 98 - 107 mmol/L    Bicarbonate 26 21 - 32 mmol/L    Anion Gap 14 10 - 20 mmol/L    Urea Nitrogen 12 6 - 23 mg/dL    Creatinine 0.88 0.50 - 1.30 mg/dL    eGFR >90 >60 mL/min/1.73m*2    Calcium 8.6 8.6 - 10.3 mg/dL       Constitutional: Well developed, awake, alert, oriented x3, no acute distress, cooperative   Eyes: EOMI, clear sclera   ENMT: mucous membranes moist, no lesions seen   Head/Neck: Neck supple, no apparent injury, head atraumatic   Respiratory/Thorax: CTAB, good chest expansion, respirations even and unlabored   Cardiovascular: Regular rate and  rhythm, mildly tachycardic, no murmurs/rubs/gallops, normal S1 and S 2   Gastrointestinal: Abdomen nondistended, soft, nontender, hypoactive BS, no bruits   Musculoskeletal: ROM intact, no joint swelling, normal  strength, LLE deferred   Extremities: no cyanosis, edema, contusions or clubbing   Neurological: no focal deficit, pt alert and oriented x3   Psychological: Appropriate affect and behavior, pleasant   Skin: Warm and dry, no lesions, no rashes       Assessment/Plan  Osteoarthritis POD #1 LEFT TOTAL KNEE REPLACEMENT     - management per primary surgery service      - continue pain and bowel regimen, pt advised to stay ahead of pain     - incentive spirometry 10x/hr while awake  ADHD     - home Ritalin continued  HTN     - resume home amlodipine  HLD     - home Crestor continued  DVT ppx     - Xarelto 10 mg daily per ortho surgery protocol      Vashti Jarrell, CNP  Hospital Medicine

## 2023-12-14 NOTE — CARE PLAN
The patient's goals for the shift include      Problem: Pain  Goal: Takes deep breaths with improved pain control throughout the shift  12/14/2023 1226 by Dorinda Jose RN  Outcome: Adequate for Discharge  12/14/2023 1013 by Dorinda Jose RN  Outcome: Progressing  Goal: Turns in bed with improved pain control throughout the shift  12/14/2023 1226 by Dorinda Jose RN  Outcome: Adequate for Discharge  12/14/2023 1013 by Dorinda Jose RN  Outcome: Progressing  Goal: Walks with improved pain control throughout the shift  12/14/2023 1226 by Dorinda Jose RN  Outcome: Adequate for Discharge  12/14/2023 1013 by Dorinda Jose RN  Outcome: Progressing  Goal: Performs ADL's with improved pain control throughout shift  12/14/2023 1226 by Dorinda Jose RN  Outcome: Adequate for Discharge  12/14/2023 1013 by Dorinda Jose RN  Outcome: Progressing  Goal: Participates in PT with improved pain control throughout the shift  12/14/2023 1226 by Dorinda Jose RN  Outcome: Adequate for Discharge  12/14/2023 1013 by Dorinda Jose RN  Outcome: Progressing  Goal: Free from opioid side effects throughout the shift  12/14/2023 1226 by Dorinda Jose RN  Outcome: Adequate for Discharge  12/14/2023 1013 by Dorinda Jose RN  Outcome: Progressing  Goal: Free from acute confusion related to pain meds throughout the shift  12/14/2023 1226 by Dorinda Jose RN  Outcome: Adequate for Discharge  12/14/2023 1013 by Dorinda Jose RN  Outcome: Progressing

## 2023-12-14 NOTE — PROGRESS NOTES
Physical Therapy    Physical Therapy Evaluation    Patient Name: Maverick Foote  MRN: [id number]  Today's Date: 12/14/2023   Time Calculation  Start Time: 0839  Stop Time: 0903  Time Calculation (min): 24 min    Assessment/Plan   PT Assessment  PT Assessment Results: Decreased strength, Decreased range of motion, Decreased endurance, Impaired balance, Decreased mobility, Pain  Rehab Prognosis: Good  End of Session Communication: Bedside nurse  End of Session Patient Position: Up in chair, Alarm off, not on at start of session  IP OR SWING BED PT PLAN  Inpatient or Swing Bed: Inpatient  PT Plan  Treatment/Interventions: Bed mobility, Transfer training, Gait training, Stair training, Strengthening, Range of motion  PT Plan: Skilled PT  PT Frequency: Daily  PT Discharge Recommendations: Low intensity level of continued care (prn assist)  Equipment Recommended upon Discharge:  (use of rw on flats, sc/hr  on steps)  PT - OK to Discharge: Yes      Subjective   General Visit Information:  General  Reason for Referral:  (l tkr)  Past Medical History Relevant to Rehab:  (l knee oa, djd, r arm fx, htn, sleep disturbance, back sx, r thr 2022, attention and concentration deficit, hld, anemia)  Prior to Session Communication: Bedside nurse  Patient Position Received:  in chair  Alarm off, not on at start of session  Home Living:  Home Living  Type of Home:  (2 story home bed/full bath on 2nd, 3 steps in w/ hr)  Lives With: Spouse (who will assist at discharge, also son in home)  Prior Level of Function:  Prior Function Per Pt/Caregiver Report  Level of [place]:  (indep w/ either sc or rw)  Homemaking Assistance:  (home mgmt per wife,  pt drives)  Precautions:  Precautions  Precautions Comment:  (falls, tkr, oob, wbat)  Vital Signs:       Objective   Pain:  Pain Assessment  Pain Assessment:  (7/10 lle)  Cognition:  Cognition  Overall Cognitive Status: Within Functional Limits      Functional Assessments:        Transfers  Transfer:  (sba chair<>rw)    Ambulation/Gait Training  Ambulation/Gait Training Performed:  (ambs w/ rw ~50ft sba, slowed, no lob, weak, decreased rom, stength, balance and endurance, high fall risk)  Extremity/Trunk Assessments:  RLE   RLE : Within Functional Limits  LLE   LLE :  (hip/knee 3-/5, ankle wfl, rom decreased l knee end range)  Outcome Measures:  [facility] Basic Mobility  Turning from your back to your side while in a flat bed without using bedrails: None  Moving from lying on your back to sitting on the side of a flat bed without using bedrails: None  Moving to and from bed to chair (including a wheelchair): A little  Standing up from a chair using your arms (e.g. wheelchair or bedside chair): A little  To walk in hospital room: A little  Climbing 3-5 steps with railing: A little  Basic Mobility - Total Score: 20    Encounter Problems       Encounter Problems (Active)       PT Problem       PT Goal 1 (Progressing)       Start:  12/14/23    Expected End:  12/15/23       Indep txs and bed mob          PT Goal 2 (Progressing)       Start:  12/14/23    Expected End:  12/15/23       Mod indep/ s  w/rw 150ft x4         PT Goal 3 (Progressing)       Start:  12/14/23    Expected End:  12/15/23       Tkr 0-100 degrees,          PT Goal 4 (Progressing)       Start:  12/14/23    Expected End:  12/15/23       20-30 reps tkr there ex         PT Goal 5 (Progressing)       Start:  12/14/23    Expected End:  12/15/23       4 steps w/ hr/straight cand and cga of 12            Pain - Adult              Education Documentation  Handouts, taught by Priya Cool, PT at 12/14/2023 10:54 AM.  Learner: Patient  Readiness: Eager  Method: Explanation, Demonstration, Handout  Response: Verbalizes Understanding, Demonstrated Understanding    Precautions, taught by Priya Cool, PT at 12/14/2023 10:54 AM.  Learner: Patient  Readiness: Eager  Method: Explanation, Demonstration, Handout  Response: Verbalizes  Understanding, Demonstrated Understanding    Home Exercise Program, taught by Priya Cool, PT at 12/14/2023 10:54 AM.  Learner: Patient  Readiness: Eager  Method: Explanation, Demonstration, Handout  Response: Verbalizes Understanding, Demonstrated Understanding    Mobility Training, taught by Priya Cool, PT at 12/14/2023 10:54 AM.  Learner: Patient  Readiness: Eager  Method: Explanation, Demonstration, Handout  Response: Verbalizes Understanding, Demonstrated Understanding        Education Comments  No comments found.

## 2023-12-14 NOTE — PROGRESS NOTES
Occupational Therapy    OT Treatment    Patient Name: Maverick Foote  MRN: [id number]  Today's Date: 12/14/2023  Time Calculation  Start Time: 0933  Stop Time: 1042  Time Calculation (min): 69 min         Assessment:        Plan:  Treatment Interventions: ADL retraining, Functional transfer training, Patient/family training, Equipment evaluation/education  OT Frequency: 2 times per day until discharge  OT Discharge Recommendations: Low intensity level of continued care  OT - OK to Discharge: Yes (to next level of care when medically cleared by physician/medical team)  Treatment Interventions: ADL retraining, Functional transfer training, Patient/family training, Equipment evaluation/education    Subjective   Previous Visit Info:     General:  General  Family/Caregiver Present:  (wife present for end of session)         Objective              Activities of Daily Living: LE Dressing  LE Dressing:  (pt. able to thread legs through pants with supervision and required sba for balance while standing to hike over hips.  instructed wife in tech to don scarlett hose.  wife able to complete with supervision.)  Functional Standing Tolerance:  Time:  (pt. tolerated 3.0 min. of static stand balance with sba while completing grooming tasks.)  Bed Mobility/Transfers:      Car Transfers  Car Transfers:  (reviewed front seat suv transfer with pt. and spouse.  both verbalized good understanding of tech.  pt. able to simulate transfer with min a.)    Standing Balance:              Outcome Measures:[facility] Daily Activity  Putting on and taking off regular lower body clothing: A little  Bathing (including washing, rinsing, drying): A little  Putting on and taking off regular upper body clothing: None  Toileting, which includes using toilet, bedpan or urinal: A little  Taking care of personal grooming such as brushing teeth: None  Eating Meals: None  Daily Activity - Total Score: 21        Education Documentation  No documentation  found.  Education Comments  No comments found.        OP EDUCATION:       Goals:  Encounter Problems       Encounter Problems (Active)       OT Goals       OT Goal 1 (Progressing)       Start:  12/14/23    Expected End:  12/14/23       Patient will complete lower body bathing/dressing and toileting with minimal assist using adaptive equipment as needed          OT Goal 2 (Progressing)       Start:  12/14/23    Expected End:  12/14/23       Patient will perform functional transfers with supervision:  bed, chair, commode using DME as needed and simulated car transfer with minimal assist          OT Goal 3 (Progressing)       Start:  12/14/23    Expected End:  12/14/23       Patient will adhere to TKR precautions to ADL's and functional transfers/mobility          OT Goal 4 (Progressing)       Start:  12/14/23    Expected End:  12/14/23       Patient will tolerate standing for 5 mins. and show good (-) standing balance during ADL's and functional transfers/mobility

## 2023-12-14 NOTE — CARE PLAN
Problem: Pain  Goal: Takes deep breaths with improved pain control throughout the shift  Outcome: Progressing  Goal: Turns in bed with improved pain control throughout the shift  Outcome: Progressing  Goal: Walks with improved pain control throughout the shift  Outcome: Progressing  Goal: Performs ADL's with improved pain control throughout shift  Outcome: Progressing  Goal: Participates in PT with improved pain control throughout the shift  Outcome: Progressing  Goal: Free from opioid side effects throughout the shift  Outcome: Progressing  Goal: Free from acute confusion related to pain meds throughout the shift  Outcome: Progressing     Problem: Pain - Adult  Goal: Verbalizes/displays adequate comfort level or baseline comfort level  Outcome: Progressing     Problem: Safety - Adult  Goal: Free from fall injury  Outcome: Progressing     Problem: Discharge Planning  Goal: Discharge to home or other facility with appropriate resources  Outcome: Progressing     Problem: Discharge Planning  Goal: Discharge to home or other facility with appropriate resources  Outcome: Progressing     Problem: Chronic Conditions and Co-morbidities  Goal: Patient's chronic conditions and co-morbidity symptoms are monitored and maintained or improved  Outcome: Progressing

## 2023-12-14 NOTE — PROGRESS NOTES
Occupational Therapy    Evaluation    Patient Name: Maverick Foote  MRN: [id number]  Today's Date: 12/14/2023  Time Calculation  Start Time: 0840  Stop Time: 0903  Time Calculation (min): 23 min    Assessment  IP OT Assessment  OT Assessment: Patient would benefit from further OT to address ADL's and functional transfers while recovering from TKR surgery done 12/13/2023.  HPI:  right THR done 12/2022.  Prognosis: Excellent  End of Session Communication:  (JARAD Mendez who will follow-up with treatment)  End of Session Patient Position: Up in chair, Alarm off, not on at start of session    Plan:  Treatment Interventions: ADL retraining, Functional transfer training, Patient/family training, Equipment evaluation/education  OT Frequency: 2 times per day until discharge  OT Discharge Recommendations: Low intensity level of continued care  OT - OK to Discharge: Yes (to next level of care when medically cleared by physician/medical team)    Subjective   Current Problem:  1. Arthritis of left knee  Surgical Pathology Exam    Surgical Pathology Exam    acetaminophen (Tylenol) 325 mg tablet    docusate sodium (Colace) 100 mg capsule    polyethylene glycol (Glycolax, Miralax) 17 gram packet    oxyCODONE (Roxicodone) 5 mg immediate release tablet    traMADol (Ultram) 50 mg tablet    amLODIPine (Norvasc) tablet 5 mg    Referral to Home Health        General:  General  Reason for Referral: recent surgery  Referred By: Perry Ocampo MD  Past Medical History Relevant to Rehab: RUE fractures, HTN, sleep disturbance, back surgery, attention/concentration deficit, HLD, anemia  Prior to Session Communication: Bedside nurse  Patient Position Received: Up in chair, Alarm off, not on at start of session  General Comment: Patient seen in room 215; cooperative, motivated, pleasant    Precautions:  LE Weight Bearing Status: Weight Bearing as Tolerated  Post-Surgical Precautions: Right total knee precautions  Vital Signs:      Pain:  Pain Assessment  Pain Assessment: 0-10  Pain Score: 7  Pain Type: Surgical pain  Pain Location: Knee  Pain Orientation: Left    Objective   Cognition:  Overall Cognitive Status: Within Functional Limits      Home Living:  Home Adaptive Equipment:  (ADL adaptive equipment)  Home Layout: Two level (colonial)  Home Access: Stairs to enter with rails (3)  Bathroom Shower/Tub: Tub/shower unit  Bathroom Toilet: Handicapped height  Bathroom Equipment: Bedside commode, Hand-held shower hose, Grab bars in shower (bath seat)     Prior Function:  Level of [place]: Independent with ADLs and functional transfers (wife does homemaking)  Ambulatory Assistance: Independent (using cane or wheeled walker as needed)  Prior Function Comments: drives; works full-time on [employer] for [email or web address]    ADL:  LE Dressing Assistance: Moderate (anticipate)  ADL Comments: to further address lower body ADL's using assistive techniques/adaptive equipment as needed while recovering from TKR     Bed Mobility/Transfers: Bed Mobility  Bed Mobility: No    Transfers  Transfer: Yes  Transfer 1  Transfer From 1: Sit to, Stand to  Transfer to 1: Chair with arms  Transfer Device 1: Walker  Transfer Level of Assistance 1:  (SBA)    Ambulation/Gait Training:  Ambulation/Gait Training  Ambulation/Gait Training Performed: Yes  Ambulation/Gait Training 1  Device 1: Rolling walker  Assistance 1:  (SBA)    Sitting Balance:  Static Sitting Balance  Static Sitting-Level of Assistance: Independent  Standing Balance:  Static Standing Balance  Static Standing-Level of Assistance:  (SBA. stood to wash hands at sink with instruction to lean again sink counter for sturdy support; patient agreeable.  Stood approx 3-4 to attempt using urinal)     Extremities: RUE   RUE : Within Functional Limits and LUE   LUE: Within Functional Limits    Outcome Measures: [facility] Daily Activity  Putting on and taking off regular lower body clothing: A lot  Bathing (including  washing, rinsing, drying): A lot  Putting on and taking off regular upper body clothing: A little  Toileting, which includes using toilet, bedpan or urinal: A lot  Taking care of personal grooming such as brushing teeth: None  Eating Meals: None  Daily Activity - Total Score: 17    Education Documentation  Precautions, taught by Leah Swanson OT at 12/14/2023 10:49 AM.  Learner: Patient  Readiness: Acceptance  Method: Explanation, Demonstration  Response: Verbalizes Understanding, Demonstrated Understanding  Comment: TKR precautions    Goals:   Encounter Problems       Encounter Problems (Active)       OT Goals       OT Goal 1 (Progressing)       Start:  12/14/23    Expected End:  12/14/23       Patient will complete lower body bathing/dressing and toileting with minimal assist using adaptive equipment as needed          OT Goal 2 (Progressing)       Start:  12/14/23    Expected End:  12/14/23       Patient will perform functional transfers with supervision:  bed, chair, commode using DME as needed and simulated car transfer with minimal assist          OT Goal 3 (Progressing)       Start:  12/14/23    Expected End:  12/14/23       Patient will adhere to TKR precautions to ADL's and functional transfers/mobility          OT Goal 4 (Progressing)       Start:  12/14/23    Expected End:  12/14/23       Patient will tolerate standing for 5 mins. and show good (-) standing balance during ADL's and functional transfers/mobility

## 2023-12-14 NOTE — DISCHARGE SUMMARY
Discharge Diagnosis  Arthritis of left knee  Obesity with BMI 33.21  Hypertension     Issues Requiring Follow-Up  Elevated BP and norvasc use     Test Results Pending At Discharge  Pending Labs       Order Current Status    Surgical Pathology Exam In process            Hospital Course   Patient is a 68 year old male with severe osteoarthritis of left knee.  On 12/13/23, patient underwent uncomplicated left total knee replacement by Dr. Ocampo.  One dose of IV antibiotics was administered preoperatively and 2 additional doses were administered post operatively.  Hospitalist service was placed on consult to help assist with post op medical management.  For DVT prophylaxis, patient was ordered xarelto 10mg daily and bilateral SCDs.  Physical and occupational therapy were initiated with full weight bearing status.  Patient progressed well with therapy.  His hemoglobin level was stable at 13.4 and he remained afebrile.  Patient did experience some hypertension with BP of 140/96.  His home dose of norvasc was resumed.  On POD #1, patient was medically stable and safe for discharge home with [facility] follow up.  At time of discharge, patient was ambulating total of 60 feet with use of wheeled walker and contact guard assistance.  His left knee range of motion was 0 to 80 degrees.  PT AM PAC 6 score was 18.  Home going script of xarelto 10mg daily for 12 days was provided to patient along with bilateral scarlett hose compression stockings to be worn at home for 4 weeks.  For pain management, oxycodone and tramadol scripts were given. Anticipate need for extended use of narcotic medications as well as higher MED requirements based on usual post operative incisional pain following total joint replacement surgery.  Patient was instructed to continue to take norvasc at home as previously prescribed.  Per patient, he had stopped taking medication due to side effects including joint/muscle discomfort.  He was instructed to monitor his BP  daily at home and follow up with his primary care physician for further instructions.  Dressing to left knee incision will be removed on POD #7.  Patient will follow up in office with Dr. Ocampo in 4 weeks.      Pertinent Physical Exam At Time of Discharge  Physical Exam    Home Medications     Medication List      START taking these medications     acetaminophen 325 mg tablet; Commonly known as: Tylenol; Take 3 tablets   (975 mg) by mouth every 8 hours.   docusate sodium 100 mg capsule; Commonly known as: Colace; Take 1   capsule (100 mg) by mouth once daily.   polyethylene glycol 17 gram packet; Commonly known as: Glycolax,   Miralax; Take 17 g by mouth once daily as needed (take dose if you do NOT   have bowel movement by Shahab Dec 17th).     CHANGE how you take these medications     amLODIPine 5 mg tablet; Commonly known as: Norvasc; What changed:   Another medication with the same name was removed. Continue taking this   medication, and follow the directions you see here.   oxyCODONE 5 mg immediate release tablet; Commonly known as: Roxicodone;   Take 1 tablet (5 mg) by mouth every 6 hours if needed for severe pain (7 -   10) for up to 7 days.; What changed: See the new instructions.   * rivaroxaban 10 mg tablet; Commonly known as: Xarelto; Take 1 tablet   (10 mg) by mouth once daily for 12 days. To begin after surgery on 12/14;   What changed: Another medication with the same name was added. Make sure   you understand how and when to take each.   * rivaroxaban 10 mg tablet; Commonly known as: Xarelto; Take 1 tablet   (10 mg) by mouth once daily for 11 doses. Do not start before December 15,   2023.; Start taking on: December 15, 2023; What changed: You were already   taking a medication with the same name, and this prescription was added.   Make sure you understand how and when to take each.   rosuvastatin 20 mg tablet; Commonly known as: Crestor; What changed:   Another medication with the same name was  removed. Continue taking this   medication, and follow the directions you see here.   traMADol 50 mg tablet; Commonly known as: Ultram; Take 1 tablet (50 mg)   by mouth every 6 hours if needed for moderate pain (4 - 6) for up to 7   days.; What changed: See the new instructions.  * This list has 2 medication(s) that are the same as other medications   prescribed for you. Read the directions carefully, and ask your doctor or   other care provider to review them with you.     CONTINUE taking these medications     betamethasone dipropionate 0.05 % ointment; Commonly known as: Diprosone   clobetasol 0.05 % ointment; Commonly known as: Temovate   methylphenidate 20 mg tablet; Commonly known as: Ritalin   pantoprazole 40 mg EC tablet; Commonly known as: ProtoNix     STOP taking these medications     diclofenac 75 mg EC tablet; Commonly known as: Voltaren   Gelsyn-3 16.8 mg/2 mL injection; Generic drug: sodium hyaluronate   HYDROcodone-acetaminophen 5-325 mg tablet; Commonly known as: Norco       Outpatient Follow-Up  Future Appointments   Date Time Provider Department Center   12/27/2023 12:00 PM Tom Chavez PT [id number] [place]   1/2/2024  1:45 PM Tom Chavez PT [id number] [place]   1/4/2024 12:00 PM Tom Chavez PT [id number] [place]   1/8/2024  1:45 PM Tom Chavez, PT [id number] [place]   1/11/2024 12:00 PM Tom Chavez PT [id number] [place]   1/16/2024 10:45 AM Perry Ocampo MD [id number] [place]       Mariluz Avalos, APRN-CNS

## 2023-12-15 ENCOUNTER — TELEPHONE (OUTPATIENT)
Dept: ORTHOPEDICS | Facility: HOSPITAL | Age: 68
End: 2023-12-15
Payer: COMMERCIAL

## 2023-12-15 ENCOUNTER — HOME CARE VISIT (OUTPATIENT)
Dept: HOME HEALTH SERVICES | Facility: HOME HEALTH | Age: 68
End: 2023-12-15
Payer: COMMERCIAL

## 2023-12-15 VITALS
DIASTOLIC BLOOD PRESSURE: 70 MMHG | TEMPERATURE: 98.2 F | HEART RATE: 113 BPM | RESPIRATION RATE: 13 BRPM | SYSTOLIC BLOOD PRESSURE: 100 MMHG

## 2023-12-15 PROCEDURE — 0023 HH SOC

## 2023-12-15 PROCEDURE — G0151 HHCP-SERV OF PT,EA 15 MIN: HCPCS

## 2023-12-15 SDOH — HEALTH STABILITY: PHYSICAL HEALTH
EXERCISE COMMENTS: ASSIGNED AP, QS,  HEEL SLIDES OR SEATED L KNEE FLEXION ROM ACTIVITIES AND WALK EVERY HOUR WHILE AWAKE AS TOLERATED.  ALSO INSTRUCTED UNSUPPORTED L KNEE EXTENSION 3-4 TIMES A DAY AS TOLERATED AS HEP.  PATIENT VOICED UNDERSTANDING.  HANDOUT PROVIDED.

## 2023-12-15 ASSESSMENT — ENCOUNTER SYMPTOMS
HIGHEST PAIN SEVERITY IN PAST 24 HOURS: 7/10
PAIN LOCATION - PAIN SEVERITY: 7/10
OCCASIONAL FEELINGS OF UNSTEADINESS: 1
PAIN LOCATION - PAIN QUALITY: ACHE
LOWEST PAIN SEVERITY IN PAST 24 HOURS: 5/10
PAIN LOCATION: LEFT KNEE
PAIN SEVERITY GOAL: 0/10
LOSS OF SENSATION IN FEET: 0
MUSCLE WEAKNESS: 1
PAIN: 1
LIMITED RANGE OF MOTION: 1
PERSON REPORTING PAIN: PATIENT
PAIN LOCATION - PAIN FREQUENCY: CONSTANT
DEPRESSION: 0
SUBJECTIVE PAIN PROGRESSION: WAXING AND WANING

## 2023-12-15 ASSESSMENT — ACTIVITIES OF DAILY LIVING (ADL)
AMBULATION ASSISTANCE: 1
AMBULATION ASSISTANCE ON FLAT SURFACES: 1
OASIS_M1830: 03
CURRENT_FUNCTION: INDEPENDENT
ENTERING_EXITING_HOME: STAND BY ASSIST
AMBULATION ASSISTANCE: STAND BY ASSIST
PHYSICAL TRANSFERS ASSESSED: 1

## 2023-12-19 ENCOUNTER — HOME CARE VISIT (OUTPATIENT)
Dept: HOME HEALTH SERVICES | Facility: HOME HEALTH | Age: 68
End: 2023-12-19
Payer: COMMERCIAL

## 2023-12-19 VITALS
SYSTOLIC BLOOD PRESSURE: 150 MMHG | TEMPERATURE: 97.5 F | HEART RATE: 85 BPM | RESPIRATION RATE: 13 BRPM | DIASTOLIC BLOOD PRESSURE: 88 MMHG

## 2023-12-19 PROCEDURE — G0151 HHCP-SERV OF PT,EA 15 MIN: HCPCS

## 2023-12-19 SDOH — HEALTH STABILITY: PHYSICAL HEALTH
EXERCISE COMMENTS: PERFORMED 10 REPS EACH OF FOLLOWING: LAQ, STANDING HIP ABD, FLEX AND EXTENSION, STANDING HEEL RAISES, MINI SQUATS AND STANDING KNEE FLEXION.

## 2023-12-19 ASSESSMENT — ENCOUNTER SYMPTOMS
PAIN LOCATION: LEFT KNEE
LOWEST PAIN SEVERITY IN PAST 24 HOURS: 4/10
PAIN LOCATION - PAIN SEVERITY: 6/10
PAIN: 1
SUBJECTIVE PAIN PROGRESSION: WAXING AND WANING
PAIN SEVERITY GOAL: 0/10
PAIN LOCATION - PAIN FREQUENCY: CONSTANT
PAIN LOCATION - PAIN QUALITY: ACHE
HIGHEST PAIN SEVERITY IN PAST 24 HOURS: 6/10
PERSON REPORTING PAIN: PATIENT

## 2023-12-22 ENCOUNTER — HOME CARE VISIT (OUTPATIENT)
Dept: HOME HEALTH SERVICES | Facility: HOME HEALTH | Age: 68
End: 2023-12-22
Payer: COMMERCIAL

## 2023-12-22 VITALS
DIASTOLIC BLOOD PRESSURE: 78 MMHG | TEMPERATURE: 97.5 F | SYSTOLIC BLOOD PRESSURE: 130 MMHG | HEART RATE: 91 BPM | RESPIRATION RATE: 13 BRPM

## 2023-12-22 PROCEDURE — G0151 HHCP-SERV OF PT,EA 15 MIN: HCPCS

## 2023-12-22 SDOH — HEALTH STABILITY: PHYSICAL HEALTH
EXERCISE COMMENTS: PERFORMED 15 REPS EACH OF FOLLOWING: LAQ, STANDING HIP ABD, FLEX AND EXTENSION, STANDING HEEL RAISES, MINI SQUATS AND STANDING KNEE FLEXION.

## 2023-12-22 ASSESSMENT — ACTIVITIES OF DAILY LIVING (ADL)
AMBULATION ASSISTANCE ON FLAT SURFACES: 1
OASIS_M1830: 00
HOME_HEALTH_OASIS: 00

## 2023-12-22 ASSESSMENT — ENCOUNTER SYMPTOMS
LOSS OF SENSATION IN FEET: 0
PAIN LOCATION: LEFT KNEE
PAIN LOCATION - PAIN FREQUENCY: CONSTANT
OCCASIONAL FEELINGS OF UNSTEADINESS: 0
MUSCLE WEAKNESS: 1
DEPRESSION: 0
LIMITED RANGE OF MOTION: 1
PAIN SEVERITY GOAL: 0/10
SUBJECTIVE PAIN PROGRESSION: WAXING AND WANING
PAIN: 1
PAIN LOCATION - PAIN SEVERITY: 6/10
LOWEST PAIN SEVERITY IN PAST 24 HOURS: 4/10
HIGHEST PAIN SEVERITY IN PAST 24 HOURS: 6/10

## 2023-12-27 ENCOUNTER — EVALUATION (OUTPATIENT)
Dept: PHYSICAL THERAPY | Facility: CLINIC | Age: 68
End: 2023-12-27
Payer: COMMERCIAL

## 2023-12-27 DIAGNOSIS — M17.12 ARTHRITIS OF KNEE, LEFT: Primary | ICD-10-CM

## 2023-12-27 DIAGNOSIS — M16.12 PRIMARY OSTEOARTHRITIS OF LEFT HIP: ICD-10-CM

## 2023-12-27 PROCEDURE — 97110 THERAPEUTIC EXERCISES: CPT | Mod: GP | Performed by: PHYSICAL THERAPIST

## 2023-12-27 PROCEDURE — 97161 PT EVAL LOW COMPLEX 20 MIN: CPT | Mod: GP | Performed by: PHYSICAL THERAPIST

## 2023-12-27 ASSESSMENT — ENCOUNTER SYMPTOMS
DEPRESSION: 0
OCCASIONAL FEELINGS OF UNSTEADINESS: 0
LOSS OF SENSATION IN FEET: 0

## 2023-12-27 ASSESSMENT — COLUMBIA-SUICIDE SEVERITY RATING SCALE - C-SSRS
6. HAVE YOU EVER DONE ANYTHING, STARTED TO DO ANYTHING, OR PREPARED TO DO ANYTHING TO END YOUR LIFE?: NO
2. HAVE YOU ACTUALLY HAD ANY THOUGHTS OF KILLING YOURSELF?: NO
1. IN THE PAST MONTH, HAVE YOU WISHED YOU WERE DEAD OR WISHED YOU COULD GO TO SLEEP AND NOT WAKE UP?: NO

## 2023-12-27 ASSESSMENT — PATIENT HEALTH QUESTIONNAIRE - PHQ9
1. LITTLE INTEREST OR PLEASURE IN DOING THINGS: NOT AT ALL
2. FEELING DOWN, DEPRESSED OR HOPELESS: NOT AT ALL
SUM OF ALL RESPONSES TO PHQ9 QUESTIONS 1 AND 2: 0

## 2023-12-27 NOTE — PROGRESS NOTES
Physical Therapy    Physical Therapy Evaluation    Patient Name: Maverick Foote  MRN: [id number]  Today's Date: 12/27/23  Time Calculation  Start Time: 1200  Stop Time: 1245  Time Calculation (min): 45 min     Insurance:  Visit number: 1   Insurance Type: Payor: [facility] / Plan: [employer] / Product Type: *No Product type* /   Authorization or Plan of Care date Range: n/a    Therapy diagnoses:    Arthritis of knee, left    General:  Reason for visit: s/p  L TKA DOS: 12/13/23  Referred by: Dr Perry Willson MD appt:  1/16/23     Precautions:  none  Fall Risk: Low     Assessment:   Patient presents s/p L TKA, will benefit from skilled PT in order to decrease pain with mobility and return to prior activity level.     Impairments: Pain, Active ROM, Strength, Balance, Activity limitations, Flexibility, Joint mobility, Decreased functional level, and Participation restrictions    Functional Limitations: Sleeping, Walking, Stair negotiation, and Standing    Recommended Treatment:  Therapeutic exercise, Manual therapy, Gait training, Home program instruction and progression, Neuromuscular re-education, Therapeutic activities, Instruction in activity modification, Electrical stimulation, and Vasopneumatic device + cold      Plan:  Plan of care was developed with input and agreement by the patient.  1-2x per week for 6-8 weeks    Rehab Potential: Good to achieve goals.    Goals:  Short Term Goals:   - Patient to be Indep in HEP for self management of symptoms    Long Term Goals:   - LEFS: 65/80 to indicate a significant improvement in overall function.      - Patient will demo 5/5 knee strength (all planes) to allow for correct gait and stair mechanics     - Patient will demo mild to no limitation AROM left knee to allow for correct mechanics with functional mobility.    - Patient to demonstrate gait with least restrictive device for all ADLS and does not demonstrate significant deviations  "that may contribute to discomfort in the future    - Patient to negotiate stairs reciprocally and descend with near equal eccentric control with use of hand rail.     Subjective:  - CC:  L TKA 12/13/23. Feels tightness and soreness. Did well with home PT. Using cane for walking currently.   - CRISTAL:  chronic OA  - PAIN - Location: around the knee Worst(past 24 hours): 3-4/10    - PAIN - Alleviating:medication, ice  Aggravating: standing, walking, stair climbing  - CURRENT MEDICAL MANAGEMENT: pain medication   - PLOF: pain prior due to OA  - FUNCTIONAL LIMITATIONS: standing, walking, stair climbing   - LIVING ENVIRONMENT: has stairs to do at home  - WORK: works full time ordering tools, currently working from home    Medical History Form: Reviewed (scanned into chart)       Objective:     SENSATION: Intact in BLEs.    GAIT: Patient ambulates with lack of full knee extension and general antalgia    SWELLING: appropriate for current time frame    PALPATION: minimal    PROM: (supine)   Knee R/L: 0-120 deg/5-94 deg    MMT (out of 5)   Hip Flex R/L: 5/4+  Hip Abd R/L: 5/4+    Knee Ext  R/L:  5/4-  Knee Flex R/L: 5/4-  Ankle DF: 5/5  Ankle PF: 5/5      Outcome Measures:  Other Measures  Lower Extremity Funtional Score (LEFS): 32/80     Treatment Performed: (\"NP\" = Not Performed)     Therapeutic Exercise:     15 minutes  Instructed and demonstrated the following to be included in HEP: heel slides, quad set for extension, SAQ, SLR, standing knee flexion stretch, hamstring and calf stretching.     Manual Therapy:       minutes      Neuromuscular Re-education:      minutes      Gait Training:            minutes      Therapeutic Activity:      minutes      Modalities:       Vasopneumatic Device       minutes  Electrical Stimulation          minutes  Ultrasound            minutes  Iontophoresis                     minutes  Cold Pack            minutes  Mechanical Traction           minutes    Evaluation Complexity: Low: 30 " minutes; Moderate   minutes; Complex PT Evaluation Time Entry: 30;  minutes    Re-Evaluation:   minutes

## 2024-01-01 LAB
LABORATORY COMMENT REPORT: NORMAL
PATH REPORT.FINAL DX SPEC: NORMAL
PATH REPORT.GROSS SPEC: NORMAL
PATH REPORT.RELEVANT HX SPEC: NORMAL
PATH REPORT.TOTAL CANCER: NORMAL

## 2024-01-02 ENCOUNTER — TREATMENT (OUTPATIENT)
Dept: PHYSICAL THERAPY | Facility: CLINIC | Age: 69
End: 2024-01-02
Payer: COMMERCIAL

## 2024-01-02 DIAGNOSIS — M16.12 PRIMARY OSTEOARTHRITIS OF LEFT HIP: ICD-10-CM

## 2024-01-02 PROCEDURE — 97110 THERAPEUTIC EXERCISES: CPT | Mod: GP | Performed by: PHYSICAL THERAPIST

## 2024-01-02 PROCEDURE — G0180 MD CERTIFICATION HHA PATIENT: HCPCS | Performed by: ORTHOPAEDIC SURGERY

## 2024-01-02 NOTE — PROGRESS NOTES
"                                                                                                                         PHYSICAL THERAPY TREATMENT NOTE    Patient Name:  Maverick Foote   Patient MRN: [id number]  Date: 01/02/24  Time Calculation  Start Time: 0140  Stop Time: 0221  Time Calculation (min): 41 min    Insurance:  Visit number: 2   Insurance Type: Payor: [employer] [place] / Plan: [employer] / Product Type: *No Product type* /   Authorization or Plan of Care date Range: n/a    Therapy diagnoses:   1. Primary osteoarthritis of left hip  Follow Up In Physical Therapy           General:  Reason for visit: s/p L TKA DOS: 12/13/23   Referred by: Dr Perry Ocampo MD  Next MD appt:  1/16/23     Precautions:  none  Fall Risk: Low    Assessment:  Patient showed improved ROM after session as well as quality of  gait and will continue to emphasize ROM at this time interval and gradual knee strengthening for stability  Progress towards functional goals: Improved gait quality.  Response to interventions: Patient tolerated therapeutic interventions well and without any adverse events.  Justification for continued skilled care: To address remaining functional, objective and subjective deficits to allow them to return to full independence with ADLs.    Plan:  Continue with ROM and strengthening progression as able    Subjective:   Patient reports his knee is stiff but pain is minimal currently. Challenged with stair climbing. HEP is going well  Progress towards functional goal: improved gait   Pain (0-10): 2    HEP adherence / understanding: states initial compliance and understanding    Objective:  L knee 3-105 deg    Treatment Performed: (\"NP\" = Not Performed)     Therapeutic Exercise:     41 minutes  Nustep 6 min  Standing calf stretch slant board 30s x 3  Standing knee flexion on box 10s x 10  Standing hamstring stretch on box 30s x 3  TKE blue 2x10  4\" step up forward 2x10  Marching on foam 1' x " 2  LAQ 3# 2x10  Seated hamstring curl G TB x20  SAQ 3# 2x10  Performed manual hamstring stretching and knee flex/ext over pressure    Manual Therapy:       minutes      Neuromuscular Re-education:      minutes      Therapeutic Activity:      minutes      Gait Training:            minutes      Modalities:       Vasopneumatic Device       minutes  Electrical Stimulation          minutes  Ultrasound            minutes  Iontophoresis                     minutes  Cold Pack            minutes  Mechanical Traction           minutes    Evaluation Complexity: Low:   minutes; Moderate   minutes; Complex   minutes    Re-Evaluation:   minutes

## 2024-01-04 ENCOUNTER — TREATMENT (OUTPATIENT)
Dept: PHYSICAL THERAPY | Facility: CLINIC | Age: 69
End: 2024-01-04
Payer: COMMERCIAL

## 2024-01-04 DIAGNOSIS — M16.12 PRIMARY OSTEOARTHRITIS OF LEFT HIP: ICD-10-CM

## 2024-01-04 DIAGNOSIS — M17.12 ARTHRITIS OF KNEE, LEFT: Primary | ICD-10-CM

## 2024-01-04 PROCEDURE — 97110 THERAPEUTIC EXERCISES: CPT | Mod: GP | Performed by: PHYSICAL THERAPIST

## 2024-01-04 NOTE — PROGRESS NOTES
"                                                                                                                         PHYSICAL THERAPY TREATMENT NOTE    Patient Name:  Maverick Foote   Patient MRN: [id number]  Date: 01/04/24  Time Calculation  Start Time: 1200  Stop Time: 1240  Time Calculation (min): 40 min    Insurance:  Visit number: 3   Insurance Type: Payor: [facility] / Plan: [employer] / Product Type: *No Product type* /   Authorization or Plan of Care date Range: n/a    Therapy diagnoses:   1. Arthritis of knee, left        2. Primary osteoarthritis of left hip  Follow Up In Physical Therapy         General:  Reason for visit: s/p L TKA DOS: 12/13/23   Referred by: MD Anamika Gallo MD appt:  1/16/23     Precautions:  none  Fall Risk: Low    Assessment:  Patient had improvement with knee flexion ROM at end of session, lacking some extension but overall gait is improving  Progress towards functional goals: Reduced frequency of pain. Reduced swelling.  Response to interventions: Tolerated treatment session well without any increase in pain.  Justification for continued skilled care: To address remaining functional, objective and subjective deficits to allow them to return to full independence with ADLs.      Plan:  Continue with ROM and strengthening progression as able    Subjective:   Patient reports his knee was good after last visit, still sore in general but doing well.  Progress towards functional goal: improved gait   Pain (0-10): 1-2/10   HEP adherence / understanding: states compliance and understanding    Objective:  L knee 3-109 deg    Treatment Performed: (\"NP\" = Not Performed)     Therapeutic Exercise:     40 minutes  Nustep 7 min  Standing calf stretch slant board 30s x 3  Standing knee flexion on box 5s x 20  Standing hamstring stretch on box 30s x 3  TKE blue 2x10  4\" step up forward and lateral 2x10 ea  Marching on foam 1' x 2  SLS 10s x 5 on foam  Mini " "squat onto box 24\" 2x10  LAQ 3# 2x10  Seated hamstring curl B TB x20  SAQ 3# 2x10  Performed manual hamstring stretching and knee flex/ext over pressure    Manual Therapy:       minutes      Neuromuscular Re-education:      minutes      Therapeutic Activity:      minutes      Gait Training:            minutes      Modalities:       Vasopneumatic Device       minutes  Electrical Stimulation          minutes  Ultrasound            minutes  Iontophoresis                     minutes  Cold Pack            minutes  Mechanical Traction           minutes    Evaluation Complexity: Low:   minutes; Moderate   minutes; Complex   minutes    Re-Evaluation:   minutes           "

## 2024-01-08 ENCOUNTER — TREATMENT (OUTPATIENT)
Dept: PHYSICAL THERAPY | Facility: CLINIC | Age: 69
End: 2024-01-08
Payer: COMMERCIAL

## 2024-01-08 DIAGNOSIS — M16.12 PRIMARY OSTEOARTHRITIS OF LEFT HIP: Primary | ICD-10-CM

## 2024-01-08 DIAGNOSIS — M17.12 ARTHRITIS OF KNEE, LEFT: ICD-10-CM

## 2024-01-08 PROCEDURE — 97110 THERAPEUTIC EXERCISES: CPT | Mod: GP | Performed by: PHYSICAL THERAPIST

## 2024-01-08 NOTE — PROGRESS NOTES
"                                                                                                                         PHYSICAL THERAPY TREATMENT NOTE    Patient Name:  Maverick Foote   Patient MRN: [id number]  Date: 01/08/24  Time Calculation  Start Time: 0145  Stop Time: 0230  Time Calculation (min): 45 min    Insurance:  Visit number: 4   Insurance Type: Payor: [employer] [place] / Plan: [employer] / Product Type: *No Product type* /   Authorization or Plan of Care date Range: n/a    Therapy diagnoses:   1. Primary osteoarthritis of left hip        2. Arthritis of knee, left             General:  Reason for visit: s/p L TKA DOS: 12/13/23   Referred by: MD Anamika Gallo MD appt:  1/16/23     Precautions:  none  Fall Risk: Low    Assessment:  Patient is improving with walking speed and distance, limited with knee extension. Gradually progressing with quad strength  Progress towards functional goals: Improved gait quality. Improved walking distance  Response to interventions: Tolerated treatment session well without any increase in pain.  Justification for continued skilled care: To address remaining functional, objective and subjective deficits to allow them to return to full independence with ADLs.    Plan:  Continue with ROM and strengthening progression as able    Subjective:   Patient reports his knee is stiff, walking better without the cane.  Progress towards functional goal: improved gait   Pain (0-10): 1-2/10   HEP adherence / understanding: states compliance and understanding    Objective:  L knee 3-109 deg    Treatment Performed: (\"NP\" = Not Performed)     Therapeutic Exercise:     45 minutes  Nustep 7 min  Standing calf stretch slant board 30s x 3  Standing knee flexion on box 5s x 20  Standing hamstring stretch on box 30s x 3  TKE blue 2x10  6\" step up forward and lateral 2x10 ea  4\" step down 2x10  Marching on foam 1' x 2  SLS 30s x 3 on foam  Mini squat onto box 24\" " 2x10  LAQ 4# 2x10  Seated hamstring curl B TB x20  SAQ 3# 2x10-NP  Performed manual hamstring stretching and knee flex/ext over pressure    Manual Therapy:       minutes      Neuromuscular Re-education:      minutes      Therapeutic Activity:      minutes      Gait Training:            minutes      Modalities:       Vasopneumatic Device       minutes  Electrical Stimulation          minutes  Ultrasound            minutes  Iontophoresis                     minutes  Cold Pack            minutes  Mechanical Traction           minutes    Evaluation Complexity: Low:   minutes; Moderate   minutes; Complex   minutes    Re-Evaluation:   minutes

## 2024-01-11 ENCOUNTER — TREATMENT (OUTPATIENT)
Dept: PHYSICAL THERAPY | Facility: CLINIC | Age: 69
End: 2024-01-11
Payer: COMMERCIAL

## 2024-01-11 DIAGNOSIS — M17.12 ARTHRITIS OF KNEE, LEFT: Primary | ICD-10-CM

## 2024-01-11 DIAGNOSIS — M16.12 PRIMARY OSTEOARTHRITIS OF LEFT HIP: ICD-10-CM

## 2024-01-11 PROCEDURE — 97110 THERAPEUTIC EXERCISES: CPT | Mod: GP | Performed by: PHYSICAL THERAPIST

## 2024-01-11 NOTE — PROGRESS NOTES
"                                                                                                                         PHYSICAL THERAPY TREATMENT NOTE    Patient Name:  Maverick Foote   Patient MRN: [id number]  Date: 01/11/24  Time Calculation  Start Time: 1200  Stop Time: 1245  Time Calculation (min): 45 min    Insurance:  Visit number: 5   Insurance Type: Payor: MED[facility] / Plan: [employer] / Product Type: *No Product type* /   Authorization or Plan of Care date Range: n/a    Therapy diagnoses:   1. Arthritis of knee, left        2. Primary osteoarthritis of left hip  Follow Up In Physical Therapy          General:  Reason for visit: s/p L TKA DOS: 12/13/23   Referred by: MD Anamika Gallo MD appt:  1/16/23     Precautions:  none  Fall Risk: Low    Assessment:  Patient continues to progress well with program with minimal discomfort today  Progress towards functional goals: Improved gait quality. Reduced intensity of pain.  Response to interventions: Patient tolerated therapeutic interventions well and without any adverse events.  Justification for continued skilled care: To address remaining functional, objective and subjective deficits to allow them to return to full independence with ADLs.      Plan:  Continue with ROM and strengthening progression as able    Subjective:   Patient reports his normal stiffness and soreness.  Progress towards functional goal: improved gait   Pain (0-10): 1-2/10   HEP adherence / understanding: states compliance and understanding    Objective:  L knee 2-109 deg    Treatment Performed: (\"NP\" = Not Performed)     Therapeutic Exercise:     45 minutes  Nustep 7 min  Standing calf stretch slant board 30s x 3  Standing knee flexion on box 5s x 20  Standing hamstring stretch on box 30s x 3  TKE blue 2x10  6\" step up forward and lateral 2x10 ea  6\" step down 2x10  Marching on foam 1' x 2  SLS 30s x 3 on foam  Mini squat onto box 24\" 2x10  LAQ 5# " 2x10  Seated hamstring curl B TB x20  BOSU mini lunge 2x10  SAQ 3# 2x10-NP  Performed manual hamstring stretching and knee flex/ext over pressure    Manual Therapy:       minutes      Neuromuscular Re-education:      minutes      Therapeutic Activity:      minutes      Gait Training:            minutes      Modalities:       Vasopneumatic Device       minutes  Electrical Stimulation          minutes  Ultrasound            minutes  Iontophoresis                     minutes  Cold Pack            minutes  Mechanical Traction           minutes    Evaluation Complexity: Low:   minutes; Moderate   minutes; Complex   minutes    Re-Evaluation:   minutes

## 2024-01-16 ENCOUNTER — OFFICE VISIT (OUTPATIENT)
Dept: ORTHOPEDIC SURGERY | Facility: CLINIC | Age: 69
End: 2024-01-16
Payer: COMMERCIAL

## 2024-01-16 ENCOUNTER — ANCILLARY PROCEDURE (OUTPATIENT)
Dept: RADIOLOGY | Facility: CLINIC | Age: 69
End: 2024-01-16
Payer: COMMERCIAL

## 2024-01-16 ENCOUNTER — APPOINTMENT (OUTPATIENT)
Dept: ORTHOPEDIC SURGERY | Facility: CLINIC | Age: 69
End: 2024-01-16
Payer: COMMERCIAL

## 2024-01-16 ENCOUNTER — APPOINTMENT (OUTPATIENT)
Dept: PHYSICAL THERAPY | Facility: CLINIC | Age: 69
End: 2024-01-16
Payer: COMMERCIAL

## 2024-01-16 VITALS — HEIGHT: 70 IN | BODY MASS INDEX: 31.35 KG/M2 | WEIGHT: 219 LBS

## 2024-01-16 DIAGNOSIS — Z96.652 STATUS POST LEFT KNEE REPLACEMENT: ICD-10-CM

## 2024-01-16 DIAGNOSIS — Z96.652 STATUS POST LEFT KNEE REPLACEMENT: Primary | ICD-10-CM

## 2024-01-16 PROCEDURE — 73560 X-RAY EXAM OF KNEE 1 OR 2: CPT | Mod: LT

## 2024-01-16 PROCEDURE — 1159F MED LIST DOCD IN RCRD: CPT | Performed by: ORTHOPAEDIC SURGERY

## 2024-01-16 PROCEDURE — 1036F TOBACCO NON-USER: CPT | Performed by: ORTHOPAEDIC SURGERY

## 2024-01-16 PROCEDURE — 99024 POSTOP FOLLOW-UP VISIT: CPT | Performed by: ORTHOPAEDIC SURGERY

## 2024-01-16 PROCEDURE — 1125F AMNT PAIN NOTED PAIN PRSNT: CPT | Performed by: ORTHOPAEDIC SURGERY

## 2024-01-16 PROCEDURE — 73560 X-RAY EXAM OF KNEE 1 OR 2: CPT | Mod: LEFT SIDE | Performed by: RADIOLOGY

## 2024-01-16 NOTE — PROGRESS NOTES
68-year-old is seen for follow-up status post left total knee replacement on December 13, 2023.  He is progressing well.  States that he is still in physical therapy and progressing well.  He is no longer using a cane.  He has been taking ibuprofen 600 mg 2-3 times a day for pain.    Left knee incision is healing well, no signs of infection.  Left knee range of motion is 3 to 110 degrees.    Multiple view x-rays of the left knee were obtained today and personally reviewed.  Left knee components are well-positioned.    Discussion with patient about expected progression following left total knee replacement.  He will continue with physical therapy.  Precautions were reviewed. Follow-up in 3 months with repeat x-rays of the left knee.

## 2024-01-22 ENCOUNTER — TREATMENT (OUTPATIENT)
Dept: PHYSICAL THERAPY | Facility: CLINIC | Age: 69
End: 2024-01-22
Payer: COMMERCIAL

## 2024-01-22 DIAGNOSIS — M16.12 PRIMARY OSTEOARTHRITIS OF LEFT HIP: ICD-10-CM

## 2024-01-22 PROCEDURE — 97110 THERAPEUTIC EXERCISES: CPT | Mod: GP | Performed by: PHYSICAL THERAPIST

## 2024-01-22 NOTE — PROGRESS NOTES
"                                                                                                                         PHYSICAL THERAPY TREATMENT NOTE    Patient Name:  Maverick Foote   Patient MRN: [id number]  Date: 01/22/24  Time Calculation  Start Time: 0230  Stop Time: 0310  Time Calculation (min): 40 min    Insurance:  Visit number: 6   Insurance Type: Payor: [employer] [place] / Plan: [employer] / Product Type: *No Product type* /   Authorization or Plan of Care date Range: n/a    Therapy diagnoses:   1. Primary osteoarthritis of left hip  Follow Up In Physical Therapy          General:  Reason for visit: s/p L TKA DOS: 12/13/23   Referred by: Dr Perry Ocampo MD  Next MD appt:  1/16/23     Precautions:  none  Fall Risk: Low    Assessment:  Patient overall has improved well with PT and will continue HEP for maintenance   Progress towards functional goals: Improved gait quality. Improved walking distance Reduced frequency of pain. Reduced pain level.  Response to interventions: Tolerated treatment session well without any increase in pain.  Justification for continued skilled care: will discharge     Plan:  Discharge     Subjective:   Patient reports his knee is stiff but able to do more around house   Progress towards functional goal: improved gait   Pain (0-10): 1-2/10   HEP adherence / understanding: states compliance and understanding    Objective:  L knee 3-111 deg    Treatment Performed: (\"NP\" = Not Performed)     Therapeutic Exercise:     40 minutes  Nustep 7 min  Standing calf stretch slant board 30s x 3  Standing knee flexion on box 5s x 20  Standing hamstring stretch on box 30s x 3  TKE blue 2x10  6\" step up forward and lateral 2x10 ea  6\" step down 2x10  Marching on foam 1' x 2  SLS 30s x 3 on foam  Mini squat onto box 24\" 2x10  LAQ 5# 2x10  Seated hamstring curl B TB x20  BOSU mini lunge 2x10  SAQ 3# 2x10-NP  Performed manual hamstring stretching and knee flex/ext over " pressure    Manual Therapy:       minutes      Neuromuscular Re-education:      minutes      Therapeutic Activity:      minutes      Gait Training:            minutes      Modalities:       Vasopneumatic Device       minutes  Electrical Stimulation          minutes  Ultrasound            minutes  Iontophoresis                     minutes  Cold Pack            minutes  Mechanical Traction           minutes    Evaluation Complexity: Low:   minutes; Moderate   minutes; Complex   minutes    Re-Evaluation:   minutes

## 2024-04-11 DIAGNOSIS — Z96.652 STATUS POST LEFT KNEE REPLACEMENT: ICD-10-CM

## 2024-04-16 ENCOUNTER — APPOINTMENT (OUTPATIENT)
Dept: ORTHOPEDIC SURGERY | Facility: CLINIC | Age: 69
End: 2024-04-16
Payer: MEDICARE

## 2024-04-22 ENCOUNTER — OFFICE VISIT (OUTPATIENT)
Dept: ORTHOPEDIC SURGERY | Facility: CLINIC | Age: 69
End: 2024-04-22
Payer: MEDICARE

## 2024-04-22 ENCOUNTER — HOSPITAL ENCOUNTER (OUTPATIENT)
Dept: RADIOLOGY | Facility: CLINIC | Age: 69
Discharge: HOME | End: 2024-04-22
Payer: MEDICARE

## 2024-04-22 VITALS — WEIGHT: 219 LBS | BODY MASS INDEX: 31.35 KG/M2 | HEIGHT: 70 IN

## 2024-04-22 DIAGNOSIS — M25.532 LEFT WRIST PAIN: Primary | ICD-10-CM

## 2024-04-22 DIAGNOSIS — M19.132 DRUJ (DISTAL RADIOULNAR JOINT) POST-TRAUMATIC ARTHRITIS, LEFT: ICD-10-CM

## 2024-04-22 DIAGNOSIS — M25.532 LEFT WRIST PAIN: ICD-10-CM

## 2024-04-22 PROCEDURE — 73110 X-RAY EXAM OF WRIST: CPT | Mod: LEFT SIDE | Performed by: RADIOLOGY

## 2024-04-22 PROCEDURE — 73110 X-RAY EXAM OF WRIST: CPT | Mod: LT

## 2024-04-22 PROCEDURE — 1160F RVW MEDS BY RX/DR IN RCRD: CPT | Performed by: ORTHOPAEDIC SURGERY

## 2024-04-22 PROCEDURE — 1036F TOBACCO NON-USER: CPT | Performed by: ORTHOPAEDIC SURGERY

## 2024-04-22 PROCEDURE — 99213 OFFICE O/P EST LOW 20 MIN: CPT | Performed by: ORTHOPAEDIC SURGERY

## 2024-04-22 PROCEDURE — 20605 DRAIN/INJ JOINT/BURSA W/O US: CPT | Performed by: ORTHOPAEDIC SURGERY

## 2024-04-22 PROCEDURE — 1159F MED LIST DOCD IN RCRD: CPT | Performed by: ORTHOPAEDIC SURGERY

## 2024-04-22 RX ORDER — LIDOCAINE HYDROCHLORIDE 20 MG/ML
1 INJECTION, SOLUTION INFILTRATION; PERINEURAL
Status: COMPLETED | OUTPATIENT
Start: 2024-04-22 | End: 2024-04-22

## 2024-04-22 RX ORDER — TRIAMCINOLONE ACETONIDE 40 MG/ML
40 INJECTION, SUSPENSION INTRA-ARTICULAR; INTRAMUSCULAR
Status: COMPLETED | OUTPATIENT
Start: 2024-04-22 | End: 2024-04-22

## 2024-04-22 RX ADMIN — TRIAMCINOLONE ACETONIDE 40 MG: 40 INJECTION, SUSPENSION INTRA-ARTICULAR; INTRAMUSCULAR at 14:27

## 2024-04-22 RX ADMIN — LIDOCAINE HYDROCHLORIDE 1 ML: 20 INJECTION, SOLUTION INFILTRATION; PERINEURAL at 14:27

## 2024-04-22 NOTE — PROGRESS NOTES
Subjective    Patient ID: Maverick Foote is a 68 y.o. male.    Chief Complaint: Pain of the Left Hand     Last Surgery: No surgery found  Last Surgery Date: No surgery found    HPI  Patient is a 68-year-old right-hand-dominant male who comes in with about a 6-week history of left wrist pain and swelling.  His symptoms are worse with attempted supination and pronation.  He does not recall any recent trauma.  He denies numbness and paresthesias.    Objective   Ortho Exam  Patient is in no acute distress.  Exam of his left hand and wrist reveals his skin envelope is intact.  He does have mild swelling dorsally.  There is no warmth erythema.  He has significant pain with attempted supination.  Patient however did not have as much pain with pronation.  He had full range of motion in his fingers.    Image Results:  X-rays of the patient's left wrist were personally reviewed.  They show no fracture or dislocation.  However he has bone-on-bone arthritic changes at the DRUJ.  He also has evidence of a DISI deformity and nearly bone-on-bone at the distal radius scaphoid joint.    Assessment/Plan   Encounter Diagnoses:  Left wrist pain    DRUJ (distal radioulnar joint) post-traumatic arthritis, left    Orders Placed This Encounter    XR wrist left 3+ views     Patient has left wrist pain and swelling secondary to arthritis.  We discussed conservative versus surgical management.  He elected undergo a Kenalog injection today.    M Inj/Asp: L distal radioulnar on 4/22/2024 2:27 PM  Indications: pain  Details: 25 G needle, dorsal approach  Medications: 40 mg triamcinolone acetonide 40 mg/mL; 1 mL lidocaine 20 mg/mL (2 %)  Outcome: tolerated well, no immediate complications  Procedure, treatment alternatives, risks and benefits explained, specific risks discussed. Consent was given by the patient. Immediately prior to procedure a time out was called to verify the correct patient, procedure, equipment, support staff and  site/side marked as required. Patient was prepped and draped in the usual sterile fashion.       Patient was informed that we will take about a week for the injection have an effect.  He otherwise will follow-up as his symptoms dictate.

## 2024-05-07 ENCOUNTER — OFFICE VISIT (OUTPATIENT)
Dept: ORTHOPEDIC SURGERY | Facility: CLINIC | Age: 69
End: 2024-05-07
Payer: MEDICARE

## 2024-05-07 ENCOUNTER — HOSPITAL ENCOUNTER (OUTPATIENT)
Dept: RADIOLOGY | Facility: CLINIC | Age: 69
Discharge: HOME | End: 2024-05-07
Payer: MEDICARE

## 2024-05-07 VITALS — BODY MASS INDEX: 31.35 KG/M2 | WEIGHT: 219 LBS | HEIGHT: 70 IN

## 2024-05-07 DIAGNOSIS — Z96.652 STATUS POST LEFT KNEE REPLACEMENT: ICD-10-CM

## 2024-05-07 DIAGNOSIS — Z96.652 STATUS POST LEFT KNEE REPLACEMENT: Primary | ICD-10-CM

## 2024-05-07 DIAGNOSIS — M16.12 ARTHRITIS OF LEFT HIP: ICD-10-CM

## 2024-05-07 PROCEDURE — 1159F MED LIST DOCD IN RCRD: CPT | Performed by: ORTHOPAEDIC SURGERY

## 2024-05-07 PROCEDURE — 73560 X-RAY EXAM OF KNEE 1 OR 2: CPT | Mod: LT

## 2024-05-07 PROCEDURE — 1160F RVW MEDS BY RX/DR IN RCRD: CPT | Performed by: ORTHOPAEDIC SURGERY

## 2024-05-07 PROCEDURE — 99213 OFFICE O/P EST LOW 20 MIN: CPT | Performed by: ORTHOPAEDIC SURGERY

## 2024-05-07 PROCEDURE — 73560 X-RAY EXAM OF KNEE 1 OR 2: CPT | Mod: LEFT SIDE | Performed by: RADIOLOGY

## 2024-05-07 PROCEDURE — 1036F TOBACCO NON-USER: CPT | Performed by: ORTHOPAEDIC SURGERY

## 2024-05-07 RX ORDER — CEFAZOLIN SODIUM 2 G/100ML
2 INJECTION, SOLUTION INTRAVENOUS ONCE
OUTPATIENT
Start: 2024-05-07 | End: 2024-05-07

## 2024-05-07 RX ORDER — SODIUM CHLORIDE, SODIUM LACTATE, POTASSIUM CHLORIDE, CALCIUM CHLORIDE 600; 310; 30; 20 MG/100ML; MG/100ML; MG/100ML; MG/100ML
100 INJECTION, SOLUTION INTRAVENOUS CONTINUOUS
OUTPATIENT
Start: 2024-05-07

## 2024-05-07 NOTE — PROGRESS NOTES
68-year-old is seen for his left total knee arthroplasty on December 13, 2023.  He is doing very well and is very satisfied with the knee.  He has been having severe sharp shooting pain in the left hip and groin is primarily limited due to this pain.  The left hip causes him pain with standing and walking and going up and down stairs and getting up and down from a chair in and out of a car and is having difficulty putting on his shoes and socks.  His knee has improved significantly when compared to prior to knee replacement.    Pleasant in no acute distress.  Walks a mildly antalgic gait.  Left knee incision is well-healed.  There is no effusion.  Range of motion is 0 to 120 degrees without instability.  There is decreased range of motion of left hip and pain with range of motion.    Multiple x-ray views of the left knee are personally reviewed and the left total knee arthroplasty is in good position and well-fixed.    A discussion about his knee was done.  Is progressed very well with the knee replacement.  Continue with the exercises.  Precautions were reviewed.  In regard to the hip he is interested in proceeding with hip replacement toward the end of the year.  At some point he will call when he decides to schedule.  He would also need an additional office visit with updated x-rays of the left hip and pelvis prior to surgery.

## 2024-08-20 ENCOUNTER — HOSPITAL ENCOUNTER (OUTPATIENT)
Dept: RADIOLOGY | Facility: CLINIC | Age: 69
Discharge: HOME | End: 2024-08-20
Payer: MEDICARE

## 2024-08-20 ENCOUNTER — OFFICE VISIT (OUTPATIENT)
Dept: ORTHOPEDIC SURGERY | Facility: CLINIC | Age: 69
End: 2024-08-20
Payer: MEDICARE

## 2024-08-20 DIAGNOSIS — M16.12 ARTHRITIS OF LEFT HIP: Primary | ICD-10-CM

## 2024-08-20 DIAGNOSIS — M16.12 ARTHRITIS OF LEFT HIP: ICD-10-CM

## 2024-08-20 PROCEDURE — 99214 OFFICE O/P EST MOD 30 MIN: CPT | Performed by: ORTHOPAEDIC SURGERY

## 2024-08-20 PROCEDURE — 73502 X-RAY EXAM HIP UNI 2-3 VIEWS: CPT | Mod: LT

## 2024-08-20 PROCEDURE — 73502 X-RAY EXAM HIP UNI 2-3 VIEWS: CPT | Mod: LEFT SIDE | Performed by: RADIOLOGY

## 2024-08-20 PROCEDURE — 1160F RVW MEDS BY RX/DR IN RCRD: CPT | Performed by: ORTHOPAEDIC SURGERY

## 2024-08-20 PROCEDURE — 1036F TOBACCO NON-USER: CPT | Performed by: ORTHOPAEDIC SURGERY

## 2024-08-20 PROCEDURE — 1159F MED LIST DOCD IN RCRD: CPT | Performed by: ORTHOPAEDIC SURGERY

## 2024-08-20 RX ORDER — CEFAZOLIN SODIUM 2 G/100ML
2 INJECTION, SOLUTION INTRAVENOUS ONCE
OUTPATIENT
Start: 2024-08-20 | End: 2024-08-20

## 2024-08-20 RX ORDER — SODIUM CHLORIDE, SODIUM LACTATE, POTASSIUM CHLORIDE, CALCIUM CHLORIDE 600; 310; 30; 20 MG/100ML; MG/100ML; MG/100ML; MG/100ML
20 INJECTION, SOLUTION INTRAVENOUS CONTINUOUS
OUTPATIENT
Start: 2024-08-20

## 2024-08-20 NOTE — LETTER
August 20, 2024     Pedro Pablo Gunter MD  [address]  [facility] [address]    Patient: Maverick Foote   YOB: 1955   Date of Visit: 8/20/2024       Dear Dr. Pedro Pablo Gunter MD:    Thank you for referring Maverick Foote to me for evaluation. Below are my notes for this consultation.  If you have questions, please do not hesitate to call me. I look forward to following your patient along with you.       Sincerely,     Perry Ocampo MD      CC: No Recipients  ______________________________________________________________________________________    69-year-old is seen with left hip pain.  He has been having persistent severe sharp shooting pain in the left hip and groin.  He has pain that is worse with standing and walking and going up and down stairs and getting up and down from a chair in and out of a car.  He has difficulty putting on his shoes and socks.  He is done very well with his right hip replacement and also with his more recent left knee replacement.  He is primarily limited at this point due to the severe pain and range of motion limitations in his left hip.    Pleasant in no acute distress.  Walks an antalgic gait.  Left hip flexes 80 degrees with limited internal/external rotation.  There is pain and crepitus and generalized tenderness.  Right hip flexes 90 degrees good internal/external rotation without pain.  Left knee incisions well-healed range of motion is 0 to 120 degrees without instability or tenderness.  Both lower extremities are well-perfused the skin is intact and muscle tone is adequate.    AP pelvis and AP/lateral x-rays of the left hip are personally reviewed and there are advanced degenerative changes complete loss of joint space and osteophyte formation subchondral sclerosis and cystic change.    A detailed discussion about hip arthritis was done.  The patient has been through extensive conservative treatment but there is persistent severe debilitating  pain.  Treatment options including no treatment were reviewed and decision was made to proceed with left total hip arthroplasty.  The surgery and postoperative course were reviewed in detail.  Risks including but not limited to infection thromboembolus neurovascular injury fracture bleeding medical problems stiffness implant failure loosening dislocation and leg length discrepancy were discussed and they understands this and has elected to proceed.  They will avoid aggravating activities in the meantime.  The patient will have medical clearance.  He will have TXA intravenously.

## 2024-08-20 NOTE — PROGRESS NOTES
69-year-old is seen with left hip pain.  He has been having persistent severe sharp shooting pain in the left hip and groin.  He has pain that is worse with standing and walking and going up and down stairs and getting up and down from a chair in and out of a car.  He has difficulty putting on his shoes and socks.  He is done very well with his right hip replacement and also with his more recent left knee replacement.  He is primarily limited at this point due to the severe pain and range of motion limitations in his left hip.    Pleasant in no acute distress.  Walks an antalgic gait.  Left hip flexes 80 degrees with limited internal/external rotation.  There is pain and crepitus and generalized tenderness.  Right hip flexes 90 degrees good internal/external rotation without pain.  Left knee incisions well-healed range of motion is 0 to 120 degrees without instability or tenderness.  Both lower extremities are well-perfused the skin is intact and muscle tone is adequate.    AP pelvis and AP/lateral x-rays of the left hip are personally reviewed and there are advanced degenerative changes complete loss of joint space and osteophyte formation subchondral sclerosis and cystic change.    A detailed discussion about hip arthritis was done.  The patient has been through extensive conservative treatment but there is persistent severe debilitating pain.  Treatment options including no treatment were reviewed and decision was made to proceed with left total hip arthroplasty.  The surgery and postoperative course were reviewed in detail.  Risks including but not limited to infection thromboembolus neurovascular injury fracture bleeding medical problems stiffness implant failure loosening dislocation and leg length discrepancy were discussed and they understands this and has elected to proceed.  They will avoid aggravating activities in the meantime.  The patient will have medical clearance.  He will have TXA intravenously.

## 2024-08-21 PROBLEM — M16.12 ARTHRITIS OF LEFT HIP: Status: ACTIVE | Noted: 2024-08-20

## 2024-10-01 ENCOUNTER — PRE-ADMISSION TESTING (OUTPATIENT)
Dept: PREADMISSION TESTING | Facility: HOSPITAL | Age: 69
End: 2024-10-01
Payer: MEDICARE

## 2024-10-01 VITALS
BODY MASS INDEX: 32.1 KG/M2 | RESPIRATION RATE: 18 BRPM | HEIGHT: 70 IN | TEMPERATURE: 98.1 F | DIASTOLIC BLOOD PRESSURE: 78 MMHG | OXYGEN SATURATION: 97 % | HEART RATE: 78 BPM | WEIGHT: 224.21 LBS | SYSTOLIC BLOOD PRESSURE: 134 MMHG

## 2024-10-01 DIAGNOSIS — Z01.818 PRE-OP TESTING: Primary | ICD-10-CM

## 2024-10-01 DIAGNOSIS — M16.12 PRIMARY OSTEOARTHRITIS OF LEFT HIP: ICD-10-CM

## 2024-10-01 DIAGNOSIS — R79.1 ABNORMAL COAGULATION PROFILE: ICD-10-CM

## 2024-10-01 DIAGNOSIS — R79.9 ABNORMAL FINDING OF BLOOD CHEMISTRY: ICD-10-CM

## 2024-10-01 LAB
ABO GROUP (TYPE) IN BLOOD: NORMAL
ANION GAP SERPL CALC-SCNC: 13 MMOL/L (ref 10–20)
ANTIBODY SCREEN: NORMAL
APTT PPP: 30 SECONDS (ref 27–38)
BUN SERPL-MCNC: 16 MG/DL (ref 6–23)
CALCIUM SERPL-MCNC: 9.2 MG/DL (ref 8.6–10.3)
CHLORIDE SERPL-SCNC: 108 MMOL/L (ref 98–107)
CO2 SERPL-SCNC: 24 MMOL/L (ref 21–32)
CREAT SERPL-MCNC: 0.95 MG/DL (ref 0.5–1.3)
EGFRCR SERPLBLD CKD-EPI 2021: 86 ML/MIN/1.73M*2
ERYTHROCYTE [DISTWIDTH] IN BLOOD BY AUTOMATED COUNT: 13.8 % (ref 11.5–14.5)
GLUCOSE SERPL-MCNC: 110 MG/DL (ref 74–99)
HCT VFR BLD AUTO: 42.1 % (ref 41–52)
HGB BLD-MCNC: 14.1 G/DL (ref 13.5–17.5)
INR PPP: 1.1 (ref 0.9–1.1)
MCH RBC QN AUTO: 29.7 PG (ref 26–34)
MCHC RBC AUTO-ENTMCNC: 33.5 G/DL (ref 32–36)
MCV RBC AUTO: 89 FL (ref 80–100)
NRBC BLD-RTO: 0 /100 WBCS (ref 0–0)
PLATELET # BLD AUTO: 228 X10*3/UL (ref 150–450)
POTASSIUM SERPL-SCNC: 3.9 MMOL/L (ref 3.5–5.3)
PROTHROMBIN TIME: 11.9 SECONDS (ref 9.8–12.8)
RBC # BLD AUTO: 4.74 X10*6/UL (ref 4.5–5.9)
RH FACTOR (ANTIGEN D): NORMAL
SODIUM SERPL-SCNC: 141 MMOL/L (ref 136–145)
WBC # BLD AUTO: 4.5 X10*3/UL (ref 4.4–11.3)

## 2024-10-01 PROCEDURE — 85730 THROMBOPLASTIN TIME PARTIAL: CPT | Performed by: NURSE PRACTITIONER

## 2024-10-01 PROCEDURE — 86901 BLOOD TYPING SEROLOGIC RH(D): CPT | Performed by: NURSE PRACTITIONER

## 2024-10-01 PROCEDURE — 36415 COLL VENOUS BLD VENIPUNCTURE: CPT | Performed by: NURSE PRACTITIONER

## 2024-10-01 PROCEDURE — 93005 ELECTROCARDIOGRAM TRACING: CPT

## 2024-10-01 PROCEDURE — 85027 COMPLETE CBC AUTOMATED: CPT | Performed by: NURSE PRACTITIONER

## 2024-10-01 PROCEDURE — 87081 CULTURE SCREEN ONLY: CPT | Mod: PARLAB

## 2024-10-01 PROCEDURE — 93010 ELECTROCARDIOGRAM REPORT: CPT | Performed by: STUDENT IN AN ORGANIZED HEALTH CARE EDUCATION/TRAINING PROGRAM

## 2024-10-01 PROCEDURE — 99204 OFFICE O/P NEW MOD 45 MIN: CPT | Performed by: NURSE PRACTITIONER

## 2024-10-01 PROCEDURE — 83036 HEMOGLOBIN GLYCOSYLATED A1C: CPT | Performed by: NURSE PRACTITIONER

## 2024-10-01 PROCEDURE — 80048 BASIC METABOLIC PNL TOTAL CA: CPT | Performed by: NURSE PRACTITIONER

## 2024-10-01 RX ORDER — DICLOFENAC SODIUM 75 MG/1
75 TABLET, DELAYED RELEASE ORAL 2 TIMES DAILY
COMMUNITY

## 2024-10-01 RX ORDER — CHLORHEXIDINE GLUCONATE 40 MG/ML
SOLUTION TOPICAL DAILY
Qty: 118 ML | Refills: 0 | Status: SHIPPED | OUTPATIENT
Start: 2024-10-01 | End: 2024-10-06

## 2024-10-01 RX ORDER — CHLORHEXIDINE GLUCONATE ORAL RINSE 1.2 MG/ML
15 SOLUTION DENTAL DAILY
Qty: 30 ML | Refills: 0 | Status: SHIPPED | OUTPATIENT
Start: 2024-10-01 | End: 2024-10-03

## 2024-10-01 RX ORDER — ALBUTEROL SULFATE 90 UG/1
2 INHALANT RESPIRATORY (INHALATION) EVERY 6 HOURS PRN
COMMUNITY

## 2024-10-01 RX ORDER — HYDROCODONE BITARTRATE AND ACETAMINOPHEN 5; 325 MG/1; MG/1
1 TABLET ORAL DAILY
COMMUNITY

## 2024-10-01 RX ORDER — AMLODIPINE BESYLATE 5 MG/1
5 TABLET ORAL DAILY
COMMUNITY

## 2024-10-01 ASSESSMENT — DUKE ACTIVITY SCORE INDEX (DASI)
CAN YOU DO MODERATE WORK AROUND THE HOUSE LIKE VACUUMING, SWEEPING FLOORS OR CARRYING GROCERIES: YES
CAN YOU CLIMB A FLIGHT OF STAIRS OR WALK UP A HILL: YES
DASI METS SCORE: 6.3
CAN YOU RUN A SHORT DISTANCE: NO
CAN YOU DO HEAVY WORK AROUND THE HOUSE LIKE SCRUBBING FLOORS OR LIFTING AND MOVING HEAVY FURNITURE: NO
TOTAL_SCORE: 28.7
CAN YOU PARTICIPATE IN MODERATE RECREATIONAL ACTIVITIES LIKE GOLF, BOWLING, DANCING, DOUBLES TENNIS OR THROWING A BASEBALL OR FOOTBALL: NO
CAN YOU DO YARD WORK LIKE RAKING LEAVES, WEEDING OR PUSHING A MOWER: YES
CAN YOU TAKE CARE OF YOURSELF (EAT, DRESS, BATHE, OR USE TOILET): YES
CAN YOU PARTICIPATE IN STRENOUS SPORTS LIKE SWIMMING, SINGLES TENNIS, FOOTBALL, BASKETBALL, OR SKIING: NO
CAN YOU WALK A BLOCK OR TWO ON LEVEL GROUND: YES
CAN YOU HAVE SEXUAL RELATIONS: YES
CAN YOU DO LIGHT WORK AROUND THE HOUSE LIKE DUSTING OR WASHING DISHES: YES
CAN YOU WALK INDOORS, SUCH AS AROUND YOUR HOUSE: YES

## 2024-10-01 NOTE — H&P (VIEW-ONLY)
CPM/PAT Evaluation       Name: Maverick PARADISE Foote (Maverick Foote)  /Age: 1955/69 y.o.     In-Person       Chief Complaint: PAT for planned Left hip surgery    69 yr old male w/PHx of ADHD, HTN, HLD, GERD and OA of Left hip referred to PAT for planned Left total hip replacement w/intra-op portable x-ray w/Dr Ocampo on 10/16/2024     Patient reports feeling overall well, denies fever, cough or recent infection. Reports remaining otherwise physically active, routinely walking dog and enjoys fishing; denies cardiac or respiratory symptoms.  Denies past issues with anesthesia.      Followed by PCP (PRATIBHA Gunter) - last visit 2024        Past Medical History:   Diagnosis Date    ADHD (attention deficit hyperactivity disorder)     Clotting disorder (Multi)     GERD (gastroesophageal reflux disease)     HLD (hyperlipidemia)     Hypertension        Past Surgical History:   Procedure Laterality Date    COLONOSCOPY      HIP ARTHROPLASTY      KNEE ARTHROPLASTY      LUMBAR LAMINECTOMY      SPINAL FUSION      TOTAL KNEE ARTHROPLASTY Left 2023       Patient Sexual activity questions deferred to the physician.    Family History   Problem Relation Name Age of Onset    Cancer Mother      Heart disease Mother      Peripheral vascular disease Father          amputation of leg    Diabetes Brother      Coronary artery disease Brother          CABGx3       No Known Allergies    Prior to Admission medications    Medication Sig Start Date End Date Taking? Authorizing Provider   acetaminophen (Tylenol) 325 mg tablet Take 3 tablets (975 mg) by mouth every 8 hours. 23   MELLY Lopes-CNS   amLODIPine (Norvasc) 5 mg tablet TAKE 1 TABLET BY MOUTH EVERY DAY 23   Historical Provider, MD   betamethasone dipropionate (Diprolene) 0.05 % ointment 1 Application 19   Historical Provider, MD   clobetasol (Temovate) 0.05 % ointment  10/20/22   Historical Provider, MD   docusate sodium (Colace) 100 mg capsule  Take 1 capsule (100 mg) by mouth once daily. 12/14/23   DANA Loeps   methylphenidate (Ritalin) 20 mg tablet  9/13/23   Historical Provider, MD   pantoprazole (ProtoNix) 40 mg EC tablet TAKE 1 TABLET BY MOUTH EVERY DAY    Historical Provider, MD   polyethylene glycol (Glycolax, Miralax) 17 gram packet Take 17 g by mouth once daily as needed (take dose if you do NOT have bowel movement by Shahab Dec 17th). 12/14/23   DANA Lopes   rivaroxaban (Xarelto) 10 mg tablet Take 1 tablet (10 mg) by mouth once daily for 12 days. To begin after surgery on 12/14 12/6/23 12/18/23  DANA Lopes   rivaroxaban (Xarelto) 10 mg tablet Take 1 tablet (10 mg) by mouth once daily for 11 doses. Do not start before December 15, 2023. 12/15/23 12/26/23  DANA Lopes   rosuvastatin (Crestor) 20 mg tablet TAKE 1 TABLET BY MOUTH DAILY AT BEDTIME 9/1/23   Historical Provider, MD        Review of Systems    Constitutional: no fever, no chills and not feeling poorly.   Eyes: no eyesight problems.   ENT: no hearing loss, no nosebleeds and no sore throat.   Cardiovascular: no chest pain, no palpitations and no extremity edema.   Respiratory: no sob, no wheezing, no cough, +sob w/exertion.   Gastrointestinal: negative for abdominal pain, blood in stools or changes in bowel habits   Genitourinary: negative for dysuria, incontinence or changes in urinary habits   Musculoskeletal: +arthralgias, ambulates independently.   Integumentary: negative for lesions, rash or itching.   Neurological: negative for confusion, dizziness or fainting.   Psychiatric: not suicidal, no anxiety and no depression.   All other systems have been reviewed and are negative for complaint.     Physical Exam  HENT:      Head: Normocephalic.   Eyes:      Pupils: Pupils are equal, round, and reactive to light.      Comments: Corrective lenses in use   Cardiovascular:      Rate and Rhythm: Normal rate and regular rhythm.      Pulses:  Normal pulses.   Pulmonary:      Effort: Pulmonary effort is normal.      Breath sounds: Normal breath sounds.   Abdominal:      General: Bowel sounds are normal.   Musculoskeletal:         General: Normal range of motion.      Cervical back: Normal range of motion.   Skin:     General: Skin is warm and dry.   Neurological:      Mental Status: He is alert and oriented to person, place, and time.   Psychiatric:         Mood and Affect: Mood normal.          PAT AIRWAY:   Airway:     Mallampati::  I    TM distance::  >3 FB    Neck ROM::  Full   upper dentures      Visit Vitals  /78   Pulse 78   Temp 36.7 °C (98.1 °F) (Temporal)   Resp 18       DASI Risk Score      Flowsheet Row Pre-Admission Testing from 10/1/2024 in [facility] Pre-Admission Testing from 12/6/2023 in [facility]   Can you take care of yourself (eat, dress, bathe, or use toilet)?  2.75 filed at 10/01/2024 0949 2.75 filed at 12/06/2023 1352   Can you walk indoors, such as around your house? 1.75 filed at 10/01/2024 0949 1.75 filed at 12/06/2023 1352   Can you walk a block or two on level ground?  2.75 filed at 10/01/2024 0949 2.75 filed at 12/06/2023 1352   Can you climb a flight of stairs or walk up a hill? 5.5 filed at 10/01/2024 0949 5.5 filed at 12/06/2023 1352   Can you run a short distance? 0 filed at 10/01/2024 0949 0 filed at 12/06/2023 1352   Can you do light work around the house like dusting or washing dishes? 2.7 filed at 10/01/2024 0949 2.7 filed at 12/06/2023 1352   Can you do moderate work around the house like vacuuming, sweeping floors or carrying groceries? 3.5 filed at 10/01/2024 0949 3.5 filed at 12/06/2023 1352   Can you do heavy work around the house like scrubbing floors or lifting and moving heavy furniture?  0 filed at 10/01/2024 0949 8 filed at 12/06/2023 1352   Can you do yard work like raking leaves, weeding or pushing a mower? 4.5 filed at 10/01/2024 0949 4.5 filed at 12/06/2023 1352   Can you  have sexual relations? 5.25 filed at 10/01/2024 0949 5.25 filed at 12/06/2023 1352   Can you participate in moderate recreational activities like golf, bowling, dancing, doubles tennis or throwing a baseball or football? 0 filed at 10/01/2024 0949 6 filed at 12/06/2023 1352   Can you participate in strenous sports like swimming, singles tennis, football, basketball, or skiing? 0 filed at 10/01/2024 0949 7.5 filed at 12/06/2023 1352   DASI SCORE 28.7 filed at 10/01/2024 0949 50.2 filed at 12/06/2023 1352   METS Score (Will be calculated only when all the questions are answered) 6.3 filed at 10/01/2024 0949 8.9 filed at 12/06/2023 1352          Caprini DVT Assessment    No data to display       Modified Frailty Index    No data to display       CHADS2 Stroke Risk  Current as of about an hour ago        N/A 3 to 100%: High Risk   2 to < 3%: Medium Risk   0 to < 2%: Low Risk     Last Change: N/A          This score determines the patient's risk of having a stroke if the patient has atrial fibrillation.        This score is not applicable to this patient. Components are not calculated.          Revised Cardiac Risk Index    No data to display       Apfel Simplified Score    No data to display       Risk Analysis Index Results This Encounter    No data found in the last 10 encounters.       Stop Bang Score      Flowsheet Row Pre-Admission Testing from 10/1/2024 in [facility] Pre-Admission Testing from 12/6/2023 in [facility]   Do you snore loudly? 1 filed at 10/01/2024 0949 0 filed at 12/06/2023 1353   Do you often feel tired or fatigued after your sleep? 0 filed at 10/01/2024 0949 0 filed at 12/06/2023 1353   Has anyone ever observed you stop breathing in your sleep? 0 filed at 10/01/2024 0949 0 filed at 12/06/2023 1353   Do you have or are you being treated for high blood pressure? 0 filed at 10/01/2024 0949 1 filed at 12/06/2023 1353   Recent BMI (Calculated) 31.4 filed at 10/01/2024 0949 33  filed at 12/06/2023 1353   Is BMI greater than 35 kg/m2? 0=No filed at 10/01/2024 0949 0=No filed at 12/06/2023 1353   Age older than 50 years old? 1=Yes filed at 10/01/2024 0949 1=Yes filed at 12/06/2023 1353   Is your neck circumference greater than 17 inches (Male) or 16 inches (Female)? 0 filed at 10/01/2024 0949 0 filed at 12/06/2023 1353   Gender - Male 1=Yes filed at 10/01/2024 0949 1=Yes filed at 12/06/2023 1353   STOP-BANG Total Score 3 filed at 10/01/2024 0949 3 filed at 12/06/2023 1353            Assessment and Plan:   # ADHD - managed w/ritalin  # HTN - continued amlodipine  # HLD - continued rosuvastatin  # GERD - continued protonix  # OA Left hip - Left total hip replacement w/intra-op portable x-ray w/Dr Ocampo on 10/16/2024    Ecg performed today - NSR (63 bpm)  Medical hx, Allergies, VS and Labs reviewed  Medications addressed w/pre-op instructions provided    Medical clearance provided (Jani, 8/28/2024)    Chest x-ray (5/15/2024)  Results:  Lungs and pleura:  Hyperinflated lungs. No focal lung consolidation. No   significant pleural effusion or pneumothorax.     Cardiomediastinal silhouette:  Stable cardiomediastinal silhouette.   Atherosclerotic calcifications of the aortic arch.     Bones and soft tissues:  Degenerative changes are present within the   thoracic spine.

## 2024-10-01 NOTE — PREPROCEDURE INSTRUCTIONS
Medication List            Accurate as of October 1, 2024 10:22 AM. Always use your most recent med list.                acetaminophen 325 mg tablet  Commonly known as: Tylenol  Take 3 tablets (975 mg) by mouth every 8 hours.  Medication Adjustments for Surgery: Take/Use as prescribed     albuterol 90 mcg/actuation inhaler  Medication Adjustments for Surgery: Take/Use as prescribed     * chlorhexidine 0.12 % solution  Commonly known as: Peridex  Use 15 mL in the mouth or throat once daily for 2 doses. Swish and Spit day before surgery and again morning on day of surgery.     * chlorhexidine 4 % external liquid  Commonly known as: Hibiclens  Apply topically once daily for 5 days. Wash daily for 5 days prior to surgery with day 5 being morning of surgery.     diclofenac 75 mg EC tablet  Commonly known as: Voltaren  Additional Medication Adjustments for Surgery: Take last dose 7 days before surgery     HYDROcodone-acetaminophen 5-325 mg tablet  Commonly known as: Norco  Medication Adjustments for Surgery: Take/Use as prescribed     methylphenidate 20 mg tablet  Commonly known as: Ritalin  Medication Adjustments for Surgery: Do Not take on the morning of surgery     pantoprazole 40 mg EC tablet  Commonly known as: ProtoNix  Medication Adjustments for Surgery: Take/Use as prescribed     * rivaroxaban 10 mg tablet  Commonly known as: Xarelto  Take 1 tablet (10 mg) by mouth once daily for 12 days. To begin after surgery on 12/14  Additional Medication Adjustments for Surgery: Other (Comment)  Notes to patient: Currently not talking     * rivaroxaban 10 mg tablet  Commonly known as: Xarelto  Take 1 tablet (10 mg) by mouth once daily for 11 doses. Do not start before December 15, 2023.  Additional Medication Adjustments for Surgery: Other (Comment)  Notes to patient: Currently not taking     rosuvastatin 20 mg tablet  Commonly known as: Crestor  Medication Adjustments for Surgery: Take/Use as prescribed           * This  list has 4 medication(s) that are the same as other medications prescribed for you. Read the directions carefully, and ask your doctor or other care provider to review them with you.                              PRE-OPERATIVE INSTRUCTIONS FOR SURGERY    *Do not eat anything after midnight the night of surgery.  This includes food of any kind (including hard candy, cough drops, mints).   You may have up to 13.5 ounces of clear liquid  until TWO hours prior to your arrival time to the hospital unless ERAS* protocol is ordered for you.  Clear liquids include water, black tea/coffee, (no milk or cream) apple juice and electrolyte drinks (GATORADE).  You may chew gum until TWO hours prior you your surgery/procedure.     *ERAS protocol: follow as instructed.  DO not drink an additional 13.5 ounces as noted above.        *One of our staff members will call you ONE business day before your surgery, between 11 am-2 pm to let you know the time to arrive.  If you have not received a call by 2 pm, call [phone]  *When you arrive at the hospital-->GO TO Registration on the ground floor  *Stop smoking 24 hours prior to surgery.  No Marijuana, CBD Oil or Vaping for 48 hours  *No alcohol 24 hours prior to surgery  *You will need a responsible adult to drive you home  -No acrylic nails or nail polish on at least one fingernail, NO polish on toes for foot surgery  -You may be asked to remove your dentures, partial plate, eyeglasses or contact lenses before going to surgery.  Please bring a case for these items.  -Body piercings need to be removed.  Jewelry and valuables should be left at home.  -Put on loose,  comfortable, clean clothing, that will accommodate bandages        What is a home antibacterial shower?  This shower is a way of cleaning the skin with a germ killing solution before surgery.  The solution contains chlorhexidine, commonly known as CHG.  CHG is a skin cleanser with germ killing ability.  Let your doctor know  if you are allergic to chlorhexidine.    Why do I need to take a preoperative antibacterial shower?  Skin is not sterile.  It is best to try to make your skin as free of germs as possible before surgery.  Proper cleansing with a germ killing soap before surgery can lower the number of germs on your skin.  This helps to reduce the risk of infection at the surgical site.  Following the instructions listed below will help you prepare your skin for surgery.      How do I use the solution?    Steps: Begin using your CHG soap 5 days before your surgery on __________________.    *First, wash and rinse your hair using the CHG soap.  Keep CHG soap away from ear canals and eyes.   Rinse completely, do not condition.  Hair extensions should be removed.    *Wash your face with your normal soap and rinse.   *Apply the CHG solution to a clean wet washcloth.  Turn the water off or move away from the water spray to avoid premature rinsing of the CHG soap as you are applying.  Firmly lather your entire body from the neck down.  Do not use on your face.    *Pay special attention to the area(s) where your incision(s) will be located unless they are on your face.  Avoid scrubbing your skin too hard.  The important part is to have the CHG soap sit on your skin for 3 minutes.   *When the 3 minutes are up, turn on the water and rinse the CHG solution off your body completely.  *Do not wash with regular soap after you  have  used the CHG soap solution.  *Pat  yourself dry with a clean, freshly laundered towel.  *Do not apply powders, deodorants or lotions.  *Dress in clean freshly laundered night clothes.    *Be sure to sleep with clean freshly laundered sheets.    *Be aware the CHG will cause stains on fabrics; if you wash them with bleach after use.  Rinse your washcloth and other linens that have contact with CHG completely.  Use only non-chlorine detergents to launder the items  used.  *The morning of surgery is the fifth day.  Repeat  the above steps and dress in clean comfortable clothing.     What is oral/dental rinse?  It is mouthwash.  It is a way of cleaning the he mouth with a germ-killing solution before your surgery.  The solution contains chlorhexidine, commonly known as CHG.  It is used inside the mouth to kill a bacteria known as Staphylococcus aureus.  Let your doctor know if you are allergic to Chlorhexidine.    Why do I need to use CHG oral/ dental rinse?  The CHG oral/dental rinse helps to kill bacteria in your mouth know as Staphylococcus aureus.  This reduces the risk of infection at the surgical site.    Using your CHG oral/dental rinse    STEPS:    Use your CHG oral/dental rinse after you brush your teeth the night before (at bedtime) and the morning of your surgery.  Follow all the directions on your prescription label.  *Use the cap on the container to measure 15 ml  *Swish (gargle if you can) the mouthwash in your mouth for at least 30 seconds, (do not swallow) and spit out  *After you use your CHG rinse, do not rinse your mouth with water, drink or eat.  Please refer to the prescription label for the appropriate time to resume oral intake.    What side effects might I have using the CHG oral/dental rinse?  CHG rinse will stick you plaque on the teeth.  Brush and floss just before use.   Teeth brushing will help avoid staining of the plaque during  use.  Teeth brushing will help avoid staining of plaque during  use.    Who should I contact if I have questions about the CHG oral/dental rinse and or CHG soap?  Please call [facility], Pre-Admission testing at [phone] if you have any questions.    What you may be asked to bring to surgery:  ___Crutches, walker      NPO Instructions:    Do not eat any food after midnight the night before your surgery/procedure.    Additional Instructions:     Day of Surgery:  Wear  comfortable loose fitting clothing  Do not use moisturizers, creams, lotions  or perfume  All jewelry and valuables should be left at home

## 2024-10-01 NOTE — CPM/PAT H&P
CPM/PAT Evaluation       Name: Maverick PARADISE Foote (Maverick Foote)  /Age: 1955/69 y.o.     In-Person       Chief Complaint: PAT for planned Left hip surgery    69 yr old male w/PHx of ADHD, HTN, HLD, GERD and OA of Left hip referred to PAT for planned Left total hip replacement w/intra-op portable x-ray w/Dr Ocampo on 10/16/2024     Patient reports feeling overall well, denies fever, cough or recent infection. Reports remaining otherwise physically active, routinely walking dog and enjoys fishing; denies cardiac or respiratory symptoms.  Denies past issues with anesthesia.      Followed by PCP (PRATIBHA Gunter) - last visit 2024        Past Medical History:   Diagnosis Date    ADHD (attention deficit hyperactivity disorder)     Clotting disorder (Multi)     GERD (gastroesophageal reflux disease)     HLD (hyperlipidemia)     Hypertension        Past Surgical History:   Procedure Laterality Date    COLONOSCOPY      HIP ARTHROPLASTY      KNEE ARTHROPLASTY      LUMBAR LAMINECTOMY      SPINAL FUSION      TOTAL KNEE ARTHROPLASTY Left 2023       Patient Sexual activity questions deferred to the physician.    Family History   Problem Relation Name Age of Onset    Cancer Mother      Heart disease Mother      Peripheral vascular disease Father          amputation of leg    Diabetes Brother      Coronary artery disease Brother          CABGx3       No Known Allergies    Prior to Admission medications    Medication Sig Start Date End Date Taking? Authorizing Provider   acetaminophen (Tylenol) 325 mg tablet Take 3 tablets (975 mg) by mouth every 8 hours. 23   MELLY Lopes-CNS   amLODIPine (Norvasc) 5 mg tablet TAKE 1 TABLET BY MOUTH EVERY DAY 23   Historical Provider, MD   betamethasone dipropionate (Diprolene) 0.05 % ointment 1 Application 19   Historical Provider, MD   clobetasol (Temovate) 0.05 % ointment  10/20/22   Historical Provider, MD   docusate sodium (Colace) 100 mg capsule  Take 1 capsule (100 mg) by mouth once daily. 12/14/23   DANA Lopes   methylphenidate (Ritalin) 20 mg tablet  9/13/23   Historical Provider, MD   pantoprazole (ProtoNix) 40 mg EC tablet TAKE 1 TABLET BY MOUTH EVERY DAY    Historical Provider, MD   polyethylene glycol (Glycolax, Miralax) 17 gram packet Take 17 g by mouth once daily as needed (take dose if you do NOT have bowel movement by Shahab Dec 17th). 12/14/23   DANA Lopes   rivaroxaban (Xarelto) 10 mg tablet Take 1 tablet (10 mg) by mouth once daily for 12 days. To begin after surgery on 12/14 12/6/23 12/18/23  DANA Lopes   rivaroxaban (Xarelto) 10 mg tablet Take 1 tablet (10 mg) by mouth once daily for 11 doses. Do not start before December 15, 2023. 12/15/23 12/26/23  DANA Lopes   rosuvastatin (Crestor) 20 mg tablet TAKE 1 TABLET BY MOUTH DAILY AT BEDTIME 9/1/23   Historical Provider, MD        Review of Systems    Constitutional: no fever, no chills and not feeling poorly.   Eyes: no eyesight problems.   ENT: no hearing loss, no nosebleeds and no sore throat.   Cardiovascular: no chest pain, no palpitations and no extremity edema.   Respiratory: no sob, no wheezing, no cough, +sob w/exertion.   Gastrointestinal: negative for abdominal pain, blood in stools or changes in bowel habits   Genitourinary: negative for dysuria, incontinence or changes in urinary habits   Musculoskeletal: +arthralgias, ambulates independently.   Integumentary: negative for lesions, rash or itching.   Neurological: negative for confusion, dizziness or fainting.   Psychiatric: not suicidal, no anxiety and no depression.   All other systems have been reviewed and are negative for complaint.     Physical Exam  HENT:      Head: Normocephalic.   Eyes:      Pupils: Pupils are equal, round, and reactive to light.      Comments: Corrective lenses in use   Cardiovascular:      Rate and Rhythm: Normal rate and regular rhythm.      Pulses:  Normal pulses.   Pulmonary:      Effort: Pulmonary effort is normal.      Breath sounds: Normal breath sounds.   Abdominal:      General: Bowel sounds are normal.   Musculoskeletal:         General: Normal range of motion.      Cervical back: Normal range of motion.   Skin:     General: Skin is warm and dry.   Neurological:      Mental Status: He is alert and oriented to person, place, and time.   Psychiatric:         Mood and Affect: Mood normal.          PAT AIRWAY:   Airway:     Mallampati::  I    TM distance::  >3 FB    Neck ROM::  Full   upper dentures      Visit Vitals  /78   Pulse 78   Temp 36.7 °C (98.1 °F) (Temporal)   Resp 18       DASI Risk Score      Flowsheet Row Pre-Admission Testing from 10/1/2024 in [facility] Pre-Admission Testing from 12/6/2023 in [facility]   Can you take care of yourself (eat, dress, bathe, or use toilet)?  2.75 filed at 10/01/2024 0949 2.75 filed at 12/06/2023 1352   Can you walk indoors, such as around your house? 1.75 filed at 10/01/2024 0949 1.75 filed at 12/06/2023 1352   Can you walk a block or two on level ground?  2.75 filed at 10/01/2024 0949 2.75 filed at 12/06/2023 1352   Can you climb a flight of stairs or walk up a hill? 5.5 filed at 10/01/2024 0949 5.5 filed at 12/06/2023 1352   Can you run a short distance? 0 filed at 10/01/2024 0949 0 filed at 12/06/2023 1352   Can you do light work around the house like dusting or washing dishes? 2.7 filed at 10/01/2024 0949 2.7 filed at 12/06/2023 1352   Can you do moderate work around the house like vacuuming, sweeping floors or carrying groceries? 3.5 filed at 10/01/2024 0949 3.5 filed at 12/06/2023 1352   Can you do heavy work around the house like scrubbing floors or lifting and moving heavy furniture?  0 filed at 10/01/2024 0949 8 filed at 12/06/2023 1352   Can you do yard work like raking leaves, weeding or pushing a mower? 4.5 filed at 10/01/2024 0949 4.5 filed at 12/06/2023 1352   Can you  have sexual relations? 5.25 filed at 10/01/2024 0949 5.25 filed at 12/06/2023 1352   Can you participate in moderate recreational activities like golf, bowling, dancing, doubles tennis or throwing a baseball or football? 0 filed at 10/01/2024 0949 6 filed at 12/06/2023 1352   Can you participate in strenous sports like swimming, singles tennis, football, basketball, or skiing? 0 filed at 10/01/2024 0949 7.5 filed at 12/06/2023 1352   DASI SCORE 28.7 filed at 10/01/2024 0949 50.2 filed at 12/06/2023 1352   METS Score (Will be calculated only when all the questions are answered) 6.3 filed at 10/01/2024 0949 8.9 filed at 12/06/2023 1352          Caprini DVT Assessment    No data to display       Modified Frailty Index    No data to display       CHADS2 Stroke Risk  Current as of about an hour ago        N/A 3 to 100%: High Risk   2 to < 3%: Medium Risk   0 to < 2%: Low Risk     Last Change: N/A          This score determines the patient's risk of having a stroke if the patient has atrial fibrillation.        This score is not applicable to this patient. Components are not calculated.          Revised Cardiac Risk Index    No data to display       Apfel Simplified Score    No data to display       Risk Analysis Index Results This Encounter    No data found in the last 10 encounters.       Stop Bang Score      Flowsheet Row Pre-Admission Testing from 10/1/2024 in [facility] Pre-Admission Testing from 12/6/2023 in [facility]   Do you snore loudly? 1 filed at 10/01/2024 0949 0 filed at 12/06/2023 1353   Do you often feel tired or fatigued after your sleep? 0 filed at 10/01/2024 0949 0 filed at 12/06/2023 1353   Has anyone ever observed you stop breathing in your sleep? 0 filed at 10/01/2024 0949 0 filed at 12/06/2023 1353   Do you have or are you being treated for high blood pressure? 0 filed at 10/01/2024 0949 1 filed at 12/06/2023 1353   Recent BMI (Calculated) 31.4 filed at 10/01/2024 0949 33  filed at 12/06/2023 1353   Is BMI greater than 35 kg/m2? 0=No filed at 10/01/2024 0949 0=No filed at 12/06/2023 1353   Age older than 50 years old? 1=Yes filed at 10/01/2024 0949 1=Yes filed at 12/06/2023 1353   Is your neck circumference greater than 17 inches (Male) or 16 inches (Female)? 0 filed at 10/01/2024 0949 0 filed at 12/06/2023 1353   Gender - Male 1=Yes filed at 10/01/2024 0949 1=Yes filed at 12/06/2023 1353   STOP-BANG Total Score 3 filed at 10/01/2024 0949 3 filed at 12/06/2023 1353            Assessment and Plan:   # ADHD - managed w/ritalin  # HTN - continued amlodipine  # HLD - continued rosuvastatin  # GERD - continued protonix  # OA Left hip - Left total hip replacement w/intra-op portable x-ray w/Dr Ocampo on 10/16/2024    Ecg performed today - NSR (63 bpm)  Medical hx, Allergies, VS and Labs reviewed  Medications addressed w/pre-op instructions provided    Medical clearance provided (Jani, 8/28/2024)    Chest x-ray (5/15/2024)  Results:  Lungs and pleura:  Hyperinflated lungs. No focal lung consolidation. No   significant pleural effusion or pneumothorax.     Cardiomediastinal silhouette:  Stable cardiomediastinal silhouette.   Atherosclerotic calcifications of the aortic arch.     Bones and soft tissues:  Degenerative changes are present within the   thoracic spine.

## 2024-10-02 LAB
ATRIAL RATE: 63 BPM
EST. AVERAGE GLUCOSE BLD GHB EST-MCNC: 108 MG/DL
HBA1C MFR BLD: 5.4 %
P AXIS: 65 DEGREES
P OFFSET: 190 MS
P ONSET: 147 MS
PR INTERVAL: 150 MS
Q ONSET: 222 MS
QRS COUNT: 11 BEATS
QRS DURATION: 106 MS
QT INTERVAL: 398 MS
QTC CALCULATION(BAZETT): 407 MS
QTC FREDERICIA: 404 MS
R AXIS: 23 DEGREES
T AXIS: 38 DEGREES
T OFFSET: 421 MS
VENTRICULAR RATE: 63 BPM

## 2024-10-03 LAB — STAPHYLOCOCCUS SPEC CULT: NORMAL

## 2024-10-09 DIAGNOSIS — M16.12 PRIMARY OSTEOARTHRITIS OF LEFT HIP: Primary | ICD-10-CM

## 2024-10-11 RX ORDER — CHLORHEXIDINE GLUCONATE 40 MG/ML
SOLUTION TOPICAL DAILY
Qty: 118 ML | Refills: 0 | Status: SHIPPED | OUTPATIENT
Start: 2024-10-11 | End: 2024-10-17 | Stop reason: HOSPADM

## 2024-10-11 RX ORDER — CHLORHEXIDINE GLUCONATE ORAL RINSE 1.2 MG/ML
15 SOLUTION DENTAL DAILY
Qty: 30 ML | Refills: 0 | Status: SHIPPED | OUTPATIENT
Start: 2024-10-11 | End: 2024-10-17 | Stop reason: HOSPADM

## 2024-10-15 ENCOUNTER — ANESTHESIA EVENT (OUTPATIENT)
Dept: OPERATING ROOM | Facility: HOSPITAL | Age: 69
End: 2024-10-15
Payer: COMMERCIAL

## 2024-10-16 ENCOUNTER — HOSPITAL ENCOUNTER (OUTPATIENT)
Facility: HOSPITAL | Age: 69
Discharge: HOME HEALTH | End: 2024-10-17
Attending: ORTHOPAEDIC SURGERY | Admitting: ORTHOPAEDIC SURGERY
Payer: MEDICARE

## 2024-10-16 ENCOUNTER — APPOINTMENT (OUTPATIENT)
Dept: RADIOLOGY | Facility: HOSPITAL | Age: 69
End: 2024-10-16
Payer: MEDICARE

## 2024-10-16 ENCOUNTER — ANESTHESIA (OUTPATIENT)
Dept: OPERATING ROOM | Facility: HOSPITAL | Age: 69
End: 2024-10-16
Payer: COMMERCIAL

## 2024-10-16 DIAGNOSIS — Z01.818 PREOP TESTING: Primary | ICD-10-CM

## 2024-10-16 DIAGNOSIS — R79.9 ABNORMAL FINDING OF BLOOD CHEMISTRY: ICD-10-CM

## 2024-10-16 DIAGNOSIS — M16.12 ARTHRITIS OF LEFT HIP: ICD-10-CM

## 2024-10-16 DIAGNOSIS — M17.12 ARTHRITIS OF LEFT KNEE: ICD-10-CM

## 2024-10-16 DIAGNOSIS — R79.1 ABNORMAL COAGULATION PROFILE: ICD-10-CM

## 2024-10-16 DIAGNOSIS — Z01.818 PRE-OP TESTING: ICD-10-CM

## 2024-10-16 LAB
ABO GROUP (TYPE) IN BLOOD: NORMAL
ANTIBODY SCREEN: NORMAL
RH FACTOR (ANTIGEN D): NORMAL

## 2024-10-16 PROCEDURE — 88311 DECALCIFY TISSUE: CPT | Mod: TC,PARLAB | Performed by: ORTHOPAEDIC SURGERY

## 2024-10-16 PROCEDURE — 3700000002 HC GENERAL ANESTHESIA TIME - EACH INCREMENTAL 1 MINUTE: Performed by: ORTHOPAEDIC SURGERY

## 2024-10-16 PROCEDURE — 7100000001 HC RECOVERY ROOM TIME - INITIAL BASE CHARGE: Performed by: ORTHOPAEDIC SURGERY

## 2024-10-16 PROCEDURE — 97165 OT EVAL LOW COMPLEX 30 MIN: CPT | Mod: GO

## 2024-10-16 PROCEDURE — 36415 COLL VENOUS BLD VENIPUNCTURE: CPT | Performed by: ORTHOPAEDIC SURGERY

## 2024-10-16 PROCEDURE — 2500000005 HC RX 250 GENERAL PHARMACY W/O HCPCS

## 2024-10-16 PROCEDURE — 3700000001 HC GENERAL ANESTHESIA TIME - INITIAL BASE CHARGE: Performed by: ORTHOPAEDIC SURGERY

## 2024-10-16 PROCEDURE — 99221 1ST HOSP IP/OBS SF/LOW 40: CPT | Performed by: INTERNAL MEDICINE

## 2024-10-16 PROCEDURE — 2780000003 HC OR 278 NO HCPCS: Performed by: ORTHOPAEDIC SURGERY

## 2024-10-16 PROCEDURE — 2500000004 HC RX 250 GENERAL PHARMACY W/ HCPCS (ALT 636 FOR OP/ED): Performed by: ANESTHESIOLOGY

## 2024-10-16 PROCEDURE — 2500000004 HC RX 250 GENERAL PHARMACY W/ HCPCS (ALT 636 FOR OP/ED)

## 2024-10-16 PROCEDURE — 73501 X-RAY EXAM HIP UNI 1 VIEW: CPT | Mod: LEFT SIDE | Performed by: RADIOLOGY

## 2024-10-16 PROCEDURE — 73501 X-RAY EXAM HIP UNI 1 VIEW: CPT | Mod: LT

## 2024-10-16 PROCEDURE — 2500000004 HC RX 250 GENERAL PHARMACY W/ HCPCS (ALT 636 FOR OP/ED): Mod: JZ | Performed by: ORTHOPAEDIC SURGERY

## 2024-10-16 PROCEDURE — 3600000018 HC OR TIME - INITIAL BASE CHARGE - PROCEDURE LEVEL SIX: Performed by: ORTHOPAEDIC SURGERY

## 2024-10-16 PROCEDURE — 2500000004 HC RX 250 GENERAL PHARMACY W/ HCPCS (ALT 636 FOR OP/ED): Performed by: ANESTHESIOLOGIST ASSISTANT

## 2024-10-16 PROCEDURE — C1713 ANCHOR/SCREW BN/BN,TIS/BN: HCPCS | Performed by: ORTHOPAEDIC SURGERY

## 2024-10-16 PROCEDURE — 7100000002 HC RECOVERY ROOM TIME - EACH INCREMENTAL 1 MINUTE: Performed by: ORTHOPAEDIC SURGERY

## 2024-10-16 PROCEDURE — 2500000001 HC RX 250 WO HCPCS SELF ADMINISTERED DRUGS (ALT 637 FOR MEDICARE OP): Performed by: CLINICAL NURSE SPECIALIST

## 2024-10-16 PROCEDURE — 7100000011 HC EXTENDED STAY RECOVERY HOURLY - NURSING UNIT

## 2024-10-16 PROCEDURE — 86900 BLOOD TYPING SEROLOGIC ABO: CPT | Performed by: ORTHOPAEDIC SURGERY

## 2024-10-16 PROCEDURE — 2500000001 HC RX 250 WO HCPCS SELF ADMINISTERED DRUGS (ALT 637 FOR MEDICARE OP): Performed by: ANESTHESIOLOGY

## 2024-10-16 PROCEDURE — 97161 PT EVAL LOW COMPLEX 20 MIN: CPT | Mod: GP

## 2024-10-16 PROCEDURE — C1776 JOINT DEVICE (IMPLANTABLE): HCPCS | Performed by: ORTHOPAEDIC SURGERY

## 2024-10-16 PROCEDURE — 3600000017 HC OR TIME - EACH INCREMENTAL 1 MINUTE - PROCEDURE LEVEL SIX: Performed by: ORTHOPAEDIC SURGERY

## 2024-10-16 PROCEDURE — 2720000007 HC OR 272 NO HCPCS: Performed by: ORTHOPAEDIC SURGERY

## 2024-10-16 PROCEDURE — 2500000001 HC RX 250 WO HCPCS SELF ADMINISTERED DRUGS (ALT 637 FOR MEDICARE OP): Performed by: ORTHOPAEDIC SURGERY

## 2024-10-16 DEVICE — PINNACLE HIP SOLUTIONS ALTRX POLYETHYLENE ACETABULAR LINER +4 NEUTRAL 36MM ID 56MM OD
Type: IMPLANTABLE DEVICE | Site: HIP | Status: FUNCTIONAL
Brand: PINNACLE ALTRX

## 2024-10-16 DEVICE — PINNACLE CANCELLOUS BONE SCREW 6.5MM X 30MM
Type: IMPLANTABLE DEVICE | Site: HIP | Status: FUNCTIONAL
Brand: PINNACLE

## 2024-10-16 DEVICE — APEX HOLE ELIMINATOR - PS
Type: IMPLANTABLE DEVICE | Site: HIP | Status: FUNCTIONAL
Brand: APEX

## 2024-10-16 DEVICE — SUMMIT FEMORAL STEM 12/14 TAPER TAPERED W/DUOFIX HA SIZE 8 HI 160MM
Type: IMPLANTABLE DEVICE | Site: HIP | Status: FUNCTIONAL
Brand: SUMMIT

## 2024-10-16 DEVICE — PINNACLE GRIPTION ACETABULAR SHELL SECTOR 56MM OD
Type: IMPLANTABLE DEVICE | Site: HIP | Status: FUNCTIONAL
Brand: PINNACLE GRIPTION

## 2024-10-16 DEVICE — BIOLOX DELTA CERAMIC FEMORAL HEAD +5.0 36MM DIA 12/14 TAPER
Type: IMPLANTABLE DEVICE | Site: HIP | Status: FUNCTIONAL
Brand: BIOLOX DELTA

## 2024-10-16 RX ORDER — ALBUTEROL SULFATE 0.83 MG/ML
2.5 SOLUTION RESPIRATORY (INHALATION) ONCE AS NEEDED
Status: DISCONTINUED | OUTPATIENT
Start: 2024-10-16 | End: 2024-10-16 | Stop reason: HOSPADM

## 2024-10-16 RX ORDER — ACETAMINOPHEN 325 MG/1
650 TABLET ORAL EVERY 4 HOURS PRN
Status: DISCONTINUED | OUTPATIENT
Start: 2024-10-16 | End: 2024-10-16 | Stop reason: HOSPADM

## 2024-10-16 RX ORDER — ASPIRIN 81 MG/1
81 TABLET ORAL 2 TIMES DAILY
Status: DISCONTINUED | OUTPATIENT
Start: 2024-10-17 | End: 2024-10-17 | Stop reason: HOSPADM

## 2024-10-16 RX ORDER — OXYCODONE HYDROCHLORIDE 5 MG/1
5 TABLET ORAL EVERY 6 HOURS PRN
Status: DISCONTINUED | OUTPATIENT
Start: 2024-10-16 | End: 2024-10-17 | Stop reason: HOSPADM

## 2024-10-16 RX ORDER — PROPOFOL 10 MG/ML
INJECTION, EMULSION INTRAVENOUS AS NEEDED
Status: DISCONTINUED | OUTPATIENT
Start: 2024-10-16 | End: 2024-10-16

## 2024-10-16 RX ORDER — SODIUM CHLORIDE, SODIUM LACTATE, POTASSIUM CHLORIDE, CALCIUM CHLORIDE 600; 310; 30; 20 MG/100ML; MG/100ML; MG/100ML; MG/100ML
20 INJECTION, SOLUTION INTRAVENOUS CONTINUOUS
Status: DISCONTINUED | OUTPATIENT
Start: 2024-10-16 | End: 2024-10-17 | Stop reason: HOSPADM

## 2024-10-16 RX ORDER — PANTOPRAZOLE SODIUM 40 MG/1
40 TABLET, DELAYED RELEASE ORAL DAILY
Status: CANCELLED | OUTPATIENT
Start: 2024-10-16

## 2024-10-16 RX ORDER — PROMETHAZINE HYDROCHLORIDE 25 MG/1
25 TABLET ORAL EVERY 6 HOURS PRN
Status: DISCONTINUED | OUTPATIENT
Start: 2024-10-16 | End: 2024-10-17 | Stop reason: HOSPADM

## 2024-10-16 RX ORDER — AMLODIPINE BESYLATE 5 MG/1
5 TABLET ORAL DAILY
Status: CANCELLED | OUTPATIENT
Start: 2024-10-16

## 2024-10-16 RX ORDER — ALBUTEROL SULFATE 90 UG/1
2 INHALANT RESPIRATORY (INHALATION) EVERY 6 HOURS PRN
Status: CANCELLED | OUTPATIENT
Start: 2024-10-16

## 2024-10-16 RX ORDER — CELECOXIB 100 MG/1
200 CAPSULE ORAL ONCE
Status: COMPLETED | OUTPATIENT
Start: 2024-10-16 | End: 2024-10-16

## 2024-10-16 RX ORDER — DOCUSATE SODIUM 100 MG/1
100 CAPSULE, LIQUID FILLED ORAL 2 TIMES DAILY
Status: DISCONTINUED | OUTPATIENT
Start: 2024-10-16 | End: 2024-10-17 | Stop reason: HOSPADM

## 2024-10-16 RX ORDER — ONDANSETRON 4 MG/1
4 TABLET, ORALLY DISINTEGRATING ORAL EVERY 8 HOURS PRN
Status: DISCONTINUED | OUTPATIENT
Start: 2024-10-16 | End: 2024-10-17 | Stop reason: HOSPADM

## 2024-10-16 RX ORDER — OXYCODONE HYDROCHLORIDE 5 MG/1
10 TABLET ORAL EVERY 4 HOURS PRN
Status: DISCONTINUED | OUTPATIENT
Start: 2024-10-16 | End: 2024-10-17 | Stop reason: HOSPADM

## 2024-10-16 RX ORDER — PHENYLEPHRINE HYDROCHLORIDE 10 MG/ML
INJECTION INTRAVENOUS AS NEEDED
Status: DISCONTINUED | OUTPATIENT
Start: 2024-10-16 | End: 2024-10-16

## 2024-10-16 RX ORDER — ROCURONIUM BROMIDE 10 MG/ML
INJECTION, SOLUTION INTRAVENOUS AS NEEDED
Status: DISCONTINUED | OUTPATIENT
Start: 2024-10-16 | End: 2024-10-16

## 2024-10-16 RX ORDER — LIDOCAINE HYDROCHLORIDE 10 MG/ML
0.1 INJECTION, SOLUTION INFILTRATION; PERINEURAL ONCE
Status: DISCONTINUED | OUTPATIENT
Start: 2024-10-16 | End: 2024-10-16 | Stop reason: HOSPADM

## 2024-10-16 RX ORDER — GABAPENTIN 300 MG/1
300 CAPSULE ORAL ONCE
Status: COMPLETED | OUTPATIENT
Start: 2024-10-16 | End: 2024-10-16

## 2024-10-16 RX ORDER — ONDANSETRON HYDROCHLORIDE 2 MG/ML
4 INJECTION, SOLUTION INTRAVENOUS ONCE AS NEEDED
Status: DISCONTINUED | OUTPATIENT
Start: 2024-10-16 | End: 2024-10-16 | Stop reason: HOSPADM

## 2024-10-16 RX ORDER — ACETAMINOPHEN 325 MG/1
650 TABLET ORAL EVERY 6 HOURS SCHEDULED
Status: DISCONTINUED | OUTPATIENT
Start: 2024-10-16 | End: 2024-10-17 | Stop reason: HOSPADM

## 2024-10-16 RX ORDER — MEPERIDINE HYDROCHLORIDE 25 MG/ML
12.5 INJECTION INTRAMUSCULAR; INTRAVENOUS; SUBCUTANEOUS EVERY 10 MIN PRN
Status: DISCONTINUED | OUTPATIENT
Start: 2024-10-16 | End: 2024-10-16 | Stop reason: HOSPADM

## 2024-10-16 RX ORDER — PROCHLORPERAZINE EDISYLATE 5 MG/ML
5 INJECTION INTRAMUSCULAR; INTRAVENOUS ONCE AS NEEDED
Status: DISCONTINUED | OUTPATIENT
Start: 2024-10-16 | End: 2024-10-16 | Stop reason: HOSPADM

## 2024-10-16 RX ORDER — SODIUM CHLORIDE, SODIUM LACTATE, POTASSIUM CHLORIDE, CALCIUM CHLORIDE 600; 310; 30; 20 MG/100ML; MG/100ML; MG/100ML; MG/100ML
100 INJECTION, SOLUTION INTRAVENOUS CONTINUOUS
Status: DISCONTINUED | OUTPATIENT
Start: 2024-10-16 | End: 2024-10-17 | Stop reason: HOSPADM

## 2024-10-16 RX ORDER — MIDAZOLAM HYDROCHLORIDE 1 MG/ML
INJECTION, SOLUTION INTRAMUSCULAR; INTRAVENOUS AS NEEDED
Status: DISCONTINUED | OUTPATIENT
Start: 2024-10-16 | End: 2024-10-16

## 2024-10-16 RX ORDER — LIDOCAINE HCL/PF 100 MG/5ML
SYRINGE (ML) INTRAVENOUS AS NEEDED
Status: DISCONTINUED | OUTPATIENT
Start: 2024-10-16 | End: 2024-10-16

## 2024-10-16 RX ORDER — GABAPENTIN 300 MG/1
300 CAPSULE ORAL ONCE
Status: DISCONTINUED | OUTPATIENT
Start: 2024-10-16 | End: 2024-10-16 | Stop reason: HOSPADM

## 2024-10-16 RX ORDER — PROMETHAZINE HYDROCHLORIDE 25 MG/1
25 SUPPOSITORY RECTAL EVERY 12 HOURS PRN
Status: DISCONTINUED | OUTPATIENT
Start: 2024-10-16 | End: 2024-10-17 | Stop reason: HOSPADM

## 2024-10-16 RX ORDER — OXYCODONE AND ACETAMINOPHEN 5; 325 MG/1; MG/1
0.5 TABLET ORAL EVERY 4 HOURS PRN
Status: DISCONTINUED | OUTPATIENT
Start: 2024-10-16 | End: 2024-10-17 | Stop reason: HOSPADM

## 2024-10-16 RX ORDER — TRAMADOL HYDROCHLORIDE 50 MG/1
50 TABLET ORAL EVERY 6 HOURS
Status: DISCONTINUED | OUTPATIENT
Start: 2024-10-16 | End: 2024-10-17 | Stop reason: HOSPADM

## 2024-10-16 RX ORDER — ONDANSETRON HYDROCHLORIDE 2 MG/ML
INJECTION, SOLUTION INTRAVENOUS AS NEEDED
Status: DISCONTINUED | OUTPATIENT
Start: 2024-10-16 | End: 2024-10-16

## 2024-10-16 RX ORDER — CEFAZOLIN SODIUM 2 G/100ML
2 INJECTION, SOLUTION INTRAVENOUS EVERY 8 HOURS
Status: COMPLETED | OUTPATIENT
Start: 2024-10-16 | End: 2024-10-17

## 2024-10-16 RX ORDER — NALOXONE HYDROCHLORIDE 1 MG/ML
0.2 INJECTION INTRAMUSCULAR; INTRAVENOUS; SUBCUTANEOUS EVERY 5 MIN PRN
Status: DISCONTINUED | OUTPATIENT
Start: 2024-10-16 | End: 2024-10-17 | Stop reason: HOSPADM

## 2024-10-16 RX ORDER — KETAMINE HCL IN NACL, ISO-OSM 100MG/10ML
SYRINGE (ML) INJECTION AS NEEDED
Status: DISCONTINUED | OUTPATIENT
Start: 2024-10-16 | End: 2024-10-16

## 2024-10-16 RX ORDER — ROSUVASTATIN CALCIUM 10 MG/1
20 TABLET, COATED ORAL NIGHTLY
Status: CANCELLED | OUTPATIENT
Start: 2024-10-16

## 2024-10-16 RX ORDER — POLYETHYLENE GLYCOL 3350 17 G/17G
17 POWDER, FOR SOLUTION ORAL DAILY
Status: DISCONTINUED | OUTPATIENT
Start: 2024-10-16 | End: 2024-10-17 | Stop reason: HOSPADM

## 2024-10-16 RX ORDER — DIPHENHYDRAMINE HYDROCHLORIDE 50 MG/ML
12.5 INJECTION INTRAMUSCULAR; INTRAVENOUS EVERY 6 HOURS PRN
Status: DISCONTINUED | OUTPATIENT
Start: 2024-10-16 | End: 2024-10-17 | Stop reason: HOSPADM

## 2024-10-16 RX ORDER — TRANEXAMIC ACID 10 MG/ML
1000 INJECTION, SOLUTION INTRAVENOUS
Status: COMPLETED | OUTPATIENT
Start: 2024-10-16 | End: 2024-10-16

## 2024-10-16 RX ORDER — ACETAMINOPHEN 325 MG/1
650 TABLET ORAL ONCE
Status: COMPLETED | OUTPATIENT
Start: 2024-10-16 | End: 2024-10-16

## 2024-10-16 RX ORDER — SODIUM CHLORIDE, SODIUM LACTATE, POTASSIUM CHLORIDE, CALCIUM CHLORIDE 600; 310; 30; 20 MG/100ML; MG/100ML; MG/100ML; MG/100ML
50 INJECTION, SOLUTION INTRAVENOUS CONTINUOUS
Status: DISCONTINUED | OUTPATIENT
Start: 2024-10-16 | End: 2024-10-16 | Stop reason: HOSPADM

## 2024-10-16 RX ORDER — DIPHENHYDRAMINE HCL 12.5MG/5ML
12.5 LIQUID (ML) ORAL EVERY 6 HOURS PRN
Status: DISCONTINUED | OUTPATIENT
Start: 2024-10-16 | End: 2024-10-17 | Stop reason: HOSPADM

## 2024-10-16 RX ORDER — PHENYLEPHRINE HCL IN 0.9% NACL 1 MG/10 ML
SYRINGE (ML) INTRAVENOUS AS NEEDED
Status: DISCONTINUED | OUTPATIENT
Start: 2024-10-16 | End: 2024-10-16

## 2024-10-16 RX ORDER — TRAMADOL HYDROCHLORIDE 50 MG/1
50 TABLET ORAL ONCE
Status: COMPLETED | OUTPATIENT
Start: 2024-10-16 | End: 2024-10-16

## 2024-10-16 RX ORDER — CEFAZOLIN SODIUM 2 G/100ML
2 INJECTION, SOLUTION INTRAVENOUS ONCE
Status: COMPLETED | OUTPATIENT
Start: 2024-10-16 | End: 2024-10-16

## 2024-10-16 RX ORDER — METHYLPHENIDATE HYDROCHLORIDE 5 MG/1
20 TABLET ORAL
Status: CANCELLED | OUTPATIENT
Start: 2024-10-16

## 2024-10-16 RX ORDER — ONDANSETRON HYDROCHLORIDE 2 MG/ML
4 INJECTION, SOLUTION INTRAVENOUS EVERY 8 HOURS PRN
Status: DISCONTINUED | OUTPATIENT
Start: 2024-10-16 | End: 2024-10-17 | Stop reason: HOSPADM

## 2024-10-16 RX ORDER — FENTANYL CITRATE 50 UG/ML
INJECTION, SOLUTION INTRAMUSCULAR; INTRAVENOUS AS NEEDED
Status: DISCONTINUED | OUTPATIENT
Start: 2024-10-16 | End: 2024-10-16

## 2024-10-16 RX ORDER — IPRATROPIUM BROMIDE 0.5 MG/2.5ML
500 SOLUTION RESPIRATORY (INHALATION) ONCE
Status: DISCONTINUED | OUTPATIENT
Start: 2024-10-16 | End: 2024-10-16 | Stop reason: HOSPADM

## 2024-10-16 RX ORDER — GLYCOPYRROLATE 0.2 MG/ML
INJECTION INTRAMUSCULAR; INTRAVENOUS AS NEEDED
Status: DISCONTINUED | OUTPATIENT
Start: 2024-10-16 | End: 2024-10-16

## 2024-10-16 SDOH — SOCIAL STABILITY: SOCIAL INSECURITY: WITHIN THE LAST YEAR, HAVE YOU BEEN AFRAID OF YOUR PARTNER OR EX-PARTNER?: NO

## 2024-10-16 SDOH — SOCIAL STABILITY: SOCIAL INSECURITY: HAS ANYONE EVER THREATENED TO HURT YOUR FAMILY OR YOUR PETS?: NO

## 2024-10-16 SDOH — SOCIAL STABILITY: SOCIAL INSECURITY: DOES ANYONE TRY TO KEEP YOU FROM HAVING/CONTACTING OTHER FRIENDS OR DOING THINGS OUTSIDE YOUR HOME?: NO

## 2024-10-16 SDOH — SOCIAL STABILITY: SOCIAL INSECURITY: HAVE YOU HAD ANY THOUGHTS OF HARMING ANYONE ELSE?: NO

## 2024-10-16 SDOH — ECONOMIC STABILITY: FOOD INSECURITY: WITHIN THE PAST 12 MONTHS, THE FOOD YOU BOUGHT JUST DIDN'T LAST AND YOU DIDN'T HAVE MONEY TO GET MORE.: NEVER TRUE

## 2024-10-16 SDOH — SOCIAL STABILITY: SOCIAL INSECURITY: DO YOU FEEL UNSAFE GOING BACK TO THE PLACE WHERE YOU ARE LIVING?: NO

## 2024-10-16 SDOH — SOCIAL STABILITY: SOCIAL INSECURITY
WITHIN THE LAST YEAR, HAVE YOU BEEN RAPED OR FORCED TO HAVE ANY KIND OF SEXUAL ACTIVITY BY YOUR PARTNER OR EX-PARTNER?: NO

## 2024-10-16 SDOH — SOCIAL STABILITY: SOCIAL INSECURITY: HAVE YOU HAD THOUGHTS OF HARMING ANYONE ELSE?: NO

## 2024-10-16 SDOH — SOCIAL STABILITY: SOCIAL INSECURITY: WITHIN THE LAST YEAR, HAVE YOU BEEN HUMILIATED OR EMOTIONALLY ABUSED IN OTHER WAYS BY YOUR PARTNER OR EX-PARTNER?: NO

## 2024-10-16 SDOH — HEALTH STABILITY: MENTAL HEALTH
DO YOU FEEL STRESS - TENSE, RESTLESS, NERVOUS, OR ANXIOUS, OR UNABLE TO SLEEP AT NIGHT BECAUSE YOUR MIND IS TROUBLED ALL THE TIME - THESE DAYS?: NOT AT ALL

## 2024-10-16 SDOH — HEALTH STABILITY: MENTAL HEALTH: HOW OFTEN DO YOU HAVE A DRINK CONTAINING ALCOHOL?: NEVER

## 2024-10-16 SDOH — HEALTH STABILITY: MENTAL HEALTH: HOW OFTEN DO YOU HAVE SIX OR MORE DRINKS ON ONE OCCASION?: NEVER

## 2024-10-16 SDOH — SOCIAL STABILITY: SOCIAL INSECURITY: ARE THERE ANY APPARENT SIGNS OF INJURIES/BEHAVIORS THAT COULD BE RELATED TO ABUSE/NEGLECT?: NO

## 2024-10-16 SDOH — HEALTH STABILITY: MENTAL HEALTH: CURRENT SMOKER: 0

## 2024-10-16 SDOH — ECONOMIC STABILITY: FOOD INSECURITY: WITHIN THE PAST 12 MONTHS, YOU WORRIED THAT YOUR FOOD WOULD RUN OUT BEFORE YOU GOT THE MONEY TO BUY MORE.: NEVER TRUE

## 2024-10-16 SDOH — SOCIAL STABILITY: SOCIAL INSECURITY: WERE YOU ABLE TO COMPLETE ALL THE BEHAVIORAL HEALTH SCREENINGS?: YES

## 2024-10-16 SDOH — ECONOMIC STABILITY: FOOD INSECURITY: HOW HARD IS IT FOR YOU TO PAY FOR THE VERY BASICS LIKE FOOD, HOUSING, MEDICAL CARE, AND HEATING?: NOT HARD AT ALL

## 2024-10-16 SDOH — ECONOMIC STABILITY: HOUSING INSECURITY: AT ANY TIME IN THE PAST 12 MONTHS, WERE YOU HOMELESS OR LIVING IN A SHELTER (INCLUDING NOW)?: NO

## 2024-10-16 SDOH — SOCIAL STABILITY: SOCIAL INSECURITY
WITHIN THE LAST YEAR, HAVE YOU BEEN KICKED, HIT, SLAPPED, OR OTHERWISE PHYSICALLY HURT BY YOUR PARTNER OR EX-PARTNER?: NO

## 2024-10-16 SDOH — HEALTH STABILITY: MENTAL HEALTH: HOW MANY DRINKS CONTAINING ALCOHOL DO YOU HAVE ON A TYPICAL DAY WHEN YOU ARE DRINKING?: PATIENT DOES NOT DRINK

## 2024-10-16 SDOH — ECONOMIC STABILITY: INCOME INSECURITY: IN THE PAST 12 MONTHS HAS THE ELECTRIC, GAS, OIL, OR WATER COMPANY THREATENED TO SHUT OFF SERVICES IN YOUR HOME?: NO

## 2024-10-16 SDOH — ECONOMIC STABILITY: HOUSING INSECURITY: IN THE PAST 12 MONTHS, HOW MANY TIMES HAVE YOU MOVED WHERE YOU WERE LIVING?: 0

## 2024-10-16 SDOH — ECONOMIC STABILITY: HOUSING INSECURITY: IN THE LAST 12 MONTHS, WAS THERE A TIME WHEN YOU WERE NOT ABLE TO PAY THE MORTGAGE OR RENT ON TIME?: NO

## 2024-10-16 SDOH — SOCIAL STABILITY: SOCIAL INSECURITY: ABUSE: ADULT

## 2024-10-16 SDOH — SOCIAL STABILITY: SOCIAL INSECURITY: ARE YOU OR HAVE YOU BEEN THREATENED OR ABUSED PHYSICALLY, EMOTIONALLY, OR SEXUALLY BY ANYONE?: NO

## 2024-10-16 SDOH — SOCIAL STABILITY: SOCIAL INSECURITY: DO YOU FEEL ANYONE HAS EXPLOITED OR TAKEN ADVANTAGE OF YOU FINANCIALLY OR OF YOUR PERSONAL PROPERTY?: NO

## 2024-10-16 SDOH — ECONOMIC STABILITY: TRANSPORTATION INSECURITY: IN THE PAST 12 MONTHS, HAS LACK OF TRANSPORTATION KEPT YOU FROM MEDICAL APPOINTMENTS OR FROM GETTING MEDICATIONS?: NO

## 2024-10-16 ASSESSMENT — COGNITIVE AND FUNCTIONAL STATUS - GENERAL
MOVING TO AND FROM BED TO CHAIR: A LITTLE
DRESSING REGULAR LOWER BODY CLOTHING: A LITTLE
CLIMB 3 TO 5 STEPS WITH RAILING: A LITTLE
HELP NEEDED FOR BATHING: A LITTLE
TURNING FROM BACK TO SIDE WHILE IN FLAT BAD: A LITTLE
MOVING FROM LYING ON BACK TO SITTING ON SIDE OF FLAT BED WITH BEDRAILS: A LITTLE
PATIENT BASELINE BEDBOUND: NO
TOILETING: A LOT
MOVING FROM LYING ON BACK TO SITTING ON SIDE OF FLAT BED WITH BEDRAILS: A LITTLE
WALKING IN HOSPITAL ROOM: A LITTLE
DRESSING REGULAR UPPER BODY CLOTHING: A LITTLE
DAILY ACTIVITIY SCORE: 16
MOBILITY SCORE: 18
TURNING FROM BACK TO SIDE WHILE IN FLAT BAD: A LITTLE
DAILY ACTIVITIY SCORE: 20
DRESSING REGULAR LOWER BODY CLOTHING: A LOT
MOBILITY SCORE: 18
WALKING IN HOSPITAL ROOM: A LITTLE
STANDING UP FROM CHAIR USING ARMS: A LITTLE
PERSONAL GROOMING: A LITTLE
CLIMB 3 TO 5 STEPS WITH RAILING: A LITTLE
DRESSING REGULAR UPPER BODY CLOTHING: A LITTLE
TOILETING: A LITTLE
MOVING TO AND FROM BED TO CHAIR: A LITTLE
HELP NEEDED FOR BATHING: A LOT
STANDING UP FROM CHAIR USING ARMS: A LITTLE

## 2024-10-16 ASSESSMENT — ACTIVITIES OF DAILY LIVING (ADL)
TOILETING: NEEDS ASSISTANCE
HEARING - LEFT EAR: FUNCTIONAL
PATIENT'S MEMORY ADEQUATE TO SAFELY COMPLETE DAILY ACTIVITIES?: YES
BATHING: INDEPENDENT
LACK_OF_TRANSPORTATION: NO
HEARING - LEFT EAR: FUNCTIONAL
GROOMING: INDEPENDENT
PATIENT'S MEMORY ADEQUATE TO SAFELY COMPLETE DAILY ACTIVITIES?: YES
DRESSING YOURSELF: INDEPENDENT
ADEQUATE_TO_COMPLETE_ADL: YES
ASSISTIVE_DEVICE: WALKER
TOILETING: NEEDS ASSISTANCE
GROOMING: INDEPENDENT
JUDGMENT_ADEQUATE_SAFELY_COMPLETE_DAILY_ACTIVITIES: YES
HEARING - RIGHT EAR: FUNCTIONAL
BATHING: INDEPENDENT
FEEDING YOURSELF: INDEPENDENT
DRESSING YOURSELF: INDEPENDENT
ASSISTIVE_DEVICE: WALKER
WALKS IN HOME: NEEDS ASSISTANCE
LACK_OF_TRANSPORTATION: NO
FEEDING YOURSELF: INDEPENDENT
HEARING - RIGHT EAR: FUNCTIONAL
WALKS IN HOME: NEEDS ASSISTANCE
JUDGMENT_ADEQUATE_SAFELY_COMPLETE_DAILY_ACTIVITIES: YES

## 2024-10-16 ASSESSMENT — COLUMBIA-SUICIDE SEVERITY RATING SCALE - C-SSRS
2. HAVE YOU ACTUALLY HAD ANY THOUGHTS OF KILLING YOURSELF?: NO
1. IN THE PAST MONTH, HAVE YOU WISHED YOU WERE DEAD OR WISHED YOU COULD GO TO SLEEP AND NOT WAKE UP?: NO
2. HAVE YOU ACTUALLY HAD ANY THOUGHTS OF KILLING YOURSELF?: NO
6. HAVE YOU EVER DONE ANYTHING, STARTED TO DO ANYTHING, OR PREPARED TO DO ANYTHING TO END YOUR LIFE?: NO
1. IN THE PAST MONTH, HAVE YOU WISHED YOU WERE DEAD OR WISHED YOU COULD GO TO SLEEP AND NOT WAKE UP?: NO

## 2024-10-16 ASSESSMENT — PAIN SCALES - GENERAL
PAINLEVEL_OUTOF10: 7
PAINLEVEL_OUTOF10: 0 - NO PAIN
PAINLEVEL_OUTOF10: 2
PAINLEVEL_OUTOF10: 5 - MODERATE PAIN
PAINLEVEL_OUTOF10: 7
PAINLEVEL_OUTOF10: 6
PAINLEVEL_OUTOF10: 10 - WORST POSSIBLE PAIN
PAINLEVEL_OUTOF10: 7
PAINLEVEL_OUTOF10: 8
PAINLEVEL_OUTOF10: 0 - NO PAIN
PAINLEVEL_OUTOF10: 0 - NO PAIN
PAINLEVEL_OUTOF10: 2
PAINLEVEL_OUTOF10: 0 - NO PAIN
PAINLEVEL_OUTOF10: 4

## 2024-10-16 ASSESSMENT — PATIENT HEALTH QUESTIONNAIRE - PHQ9
SUM OF ALL RESPONSES TO PHQ9 QUESTIONS 1 & 2: 0
1. LITTLE INTEREST OR PLEASURE IN DOING THINGS: NOT AT ALL
1. LITTLE INTEREST OR PLEASURE IN DOING THINGS: NOT AT ALL
SUM OF ALL RESPONSES TO PHQ9 QUESTIONS 1 & 2: 0
1. LITTLE INTEREST OR PLEASURE IN DOING THINGS: NOT AT ALL
2. FEELING DOWN, DEPRESSED OR HOPELESS: NOT AT ALL
SUM OF ALL RESPONSES TO PHQ9 QUESTIONS 1 & 2: 0
2. FEELING DOWN, DEPRESSED OR HOPELESS: NOT AT ALL
2. FEELING DOWN, DEPRESSED OR HOPELESS: NOT AT ALL

## 2024-10-16 ASSESSMENT — PAIN DESCRIPTION - DESCRIPTORS
DESCRIPTORS: PRESSURE;ACHING
DESCRIPTORS: ACHING

## 2024-10-16 ASSESSMENT — LIFESTYLE VARIABLES
SKIP TO QUESTIONS 9-10: 1
PRESCIPTION_ABUSE_PAST_12_MONTHS: NO
HOW MANY STANDARD DRINKS CONTAINING ALCOHOL DO YOU HAVE ON A TYPICAL DAY: PATIENT DOES NOT DRINK
SKIP TO QUESTIONS 9-10: 1
HOW OFTEN DO YOU HAVE 6 OR MORE DRINKS ON ONE OCCASION: NEVER
HOW MANY STANDARD DRINKS CONTAINING ALCOHOL DO YOU HAVE ON A TYPICAL DAY: PATIENT DOES NOT DRINK
HOW MANY STANDARD DRINKS CONTAINING ALCOHOL DO YOU HAVE ON A TYPICAL DAY: PATIENT DOES NOT DRINK
AUDIT-C TOTAL SCORE: 0
AUDIT-C TOTAL SCORE: 0
SUBSTANCE_ABUSE_PAST_12_MONTHS: YES
HOW OFTEN DO YOU HAVE 6 OR MORE DRINKS ON ONE OCCASION: NEVER
SKIP TO QUESTIONS 9-10: 1
HOW OFTEN DO YOU HAVE 6 OR MORE DRINKS ON ONE OCCASION: NEVER
SKIP TO QUESTIONS 9-10: 1
SKIP TO QUESTIONS 9-10: 1
AUDIT-C TOTAL SCORE: 0
HOW OFTEN DO YOU HAVE A DRINK CONTAINING ALCOHOL: NEVER
AUDIT-C TOTAL SCORE: 0
HOW OFTEN DO YOU HAVE A DRINK CONTAINING ALCOHOL: NEVER
AUDIT-C TOTAL SCORE: 0
HOW OFTEN DO YOU HAVE A DRINK CONTAINING ALCOHOL: NEVER

## 2024-10-16 ASSESSMENT — PAIN - FUNCTIONAL ASSESSMENT
PAIN_FUNCTIONAL_ASSESSMENT: 0-10

## 2024-10-16 ASSESSMENT — PAIN DESCRIPTION - ORIENTATION
ORIENTATION: LEFT

## 2024-10-16 ASSESSMENT — PAIN DESCRIPTION - LOCATION
LOCATION: HIP

## 2024-10-16 NOTE — CONSULTS
"                                                Hospital Medicine Consult Note       Patient Name: Maverick Foote   YOB: 1955      Subjective:    Maverick Foote is a 69 y.o. male with ADHD, HTN, HLD, GERD and OA of Left hip, who presented to the hospital for elective left total hip replacement. Post-op pain is controlled. He endorses some nausea but no vomiting. Resting in bed comfortably. No additional concerns.     A 10 point ROS was completed and is negative expect as stated in HPI.     Past Medical History:   Diagnosis Date    ADHD (attention deficit hyperactivity disorder)     Clotting disorder (Multi)     GERD (gastroesophageal reflux disease)     HLD (hyperlipidemia)     Hypertension        Past Surgical History:   Procedure Laterality Date    COLONOSCOPY      HIP ARTHROPLASTY      KNEE ARTHROPLASTY      LUMBAR LAMINECTOMY      SPINAL FUSION      TOTAL KNEE ARTHROPLASTY Left 2023       Family History   Problem Relation Name Age of Onset    Cancer Mother      Heart disease Mother      Peripheral vascular disease Father          amputation of leg    Diabetes Brother      Coronary artery disease Brother          CABGx3        Social History     Tobacco Use    Smoking status: Former     Current packs/day: 0.00     Average packs/day: 2.0 packs/day for 30.0 years (60.0 ttl pk-yrs)     Types: Cigarettes     Start date: 1971     Quit date: 2001     Years since quittin.6    Smokeless tobacco: Never   Vaping Use    Vaping status: Never Used   Substance Use Topics    Alcohol use: Yes     Alcohol/week: 3.0 standard drinks of alcohol     Types: 3 Cans of beer per week    Drug use: Defer        Objective:    /65   Pulse 62   Temp 36.1 °C (97 °F)   Resp 16   Ht 1.778 m (5' 10\")   Wt 102 kg (224 lb 9.3 oz)   SpO2 96%   BMI 32.22 kg/m²     Physical Exam:    GENERAL: Well developed, no apparent distress. Alert and cooperative.  CARDIOVASCULAR: Regular rate and rhythm. " No murmurs. S1/S2.  RESPIRATORY: Clear to auscultation b/l. No wheezes, rales or rhonchi  ABDOMEN: Soft, non-tender. Not distended. No rebound or guarding.  MUSCULOSKELETAL: Left hip dressing clean & dry  NEUROLOGICAL: A&Ox3. CN II-XII are grossly intact. No focal deficits.   PSYCH: Appropriate mood and affect.     Assessment/Plan:    L hip OA s/p total hip replacement 10/16    ADHD  HTN, HLD  GERD    Post op management as per primary service  Home medication reconciliation reviewed  Will review AM labs when available     DVT Prophylaxis: per surgical team  Code Status: Full Code    Hospitalist service will follow. Please reach out via EpicChat with any questions or concerns.       Donna Bautista, DO  Hospital Medicine

## 2024-10-16 NOTE — PROGRESS NOTES
Occupational Therapy    Evaluation    Patient Name: Maverick Foote  MRN: [id number]  Department: [facility]  Room: [address] [place] [address]  Today's Date: 10/16/2024  Time Calculation  Start Time: 1325  Stop Time: 1337  Time Calculation (min): 12 min        Assessment:  End of Session Communication: Bedside nurse, PCT/NA/CTA  End of Session Patient Position: Bed, 2 rail up, Alarm on (call light in reach, ice pack to L hip)  OT Assessment Results: Decreased ADL status, Decreased upper extremity strength, Decreased endurance, Decreased functional mobility  Strengths: Physical health, Premorbid level of function, Rehab experience, Support and attitude of living partners  Plan:  Treatment Interventions: ADL retraining, Functional transfer training, UE strengthening/ROM, Endurance training, Equipment evaluation/education, Compensatory technique education, Fine motor coordination activities  OT Frequency: 5 times per week  OT Discharge Recommendations: Low intensity level of continued care (with 24 hr support)  OT - OK to Discharge: Yes (once medically appropriate to next level of care)  Treatment Interventions: ADL retraining, Functional transfer training, UE strengthening/ROM, Endurance training, Equipment evaluation/education, Compensatory technique education, Fine motor coordination activities    Subjective   Current Problem:  1. Preop testing  chlorhexidine (Peridex) 0.12 % solution    chlorhexidine (Hibiclens) 4 % external liquid      2. Pre-op testing  CBC    CBC    Coagulation Screen    Coagulation Screen    Basic Metabolic Panel    Basic Metabolic Panel    Type And Screen    Type And Screen    Hemoglobin A1c    Hemoglobin A1c      3. Abnormal coagulation profile  Coagulation Screen    Coagulation Screen      4. Abnormal finding of blood chemistry  Hemoglobin A1c    Hemoglobin A1c      5. Arthritis of left knee  amLODIPine (Norvasc) tablet 5 mg      6. Arthritis of left hip  Surgical Pathology Exam    Surgical Pathology Exam         General:  General  Reason for Referral: OT eval and tx; ADLs. s/p:  LEFT TOTAL HIP REPLACEMENT  Referred By: Terrence  Past Medical History Relevant to Rehab: 69 yr old male w/PHx of ADHD, HTN, HLD, GERD and OA of Left hip. surgical history: COLONOSCOPY , HIP ARTHROPLASTY, KNEE ARTHROPLASTY, LUMBAR LAMINECTOMY 1978 ,SPINAL FUSION ,TOTAL KNEE ARTHROPLASTY  Family/Caregiver Present:  (patient spouse present, very supportive)  Co-Treatment: PT  Co-Treatment Reason: for safety and to maximize therapeutic performance  Prior to Session Communication: Bedside nurse, PCT/NA/CTA  Patient Position Received: Bed, 2 rail up, Alarm off, not on at start of session  General Comment: patient groggy, however, pleasant and cooperative to participate  Precautions:  Medical Precautions: Fall precautions (alarms, OOB with assist, FWB, Left posterior hip precautions, IV in place      Pain:  Pain Assessment  Pain Assessment: 0-10  0-10 (Numeric) Pain Score: 0 - No pain    Objective   Cognition:  Overall Cognitive Status: Within Functional Limits           Home Living:  Type of Home:  (per patient report upon eval: lives in house with spouse, 3 BONITA with HR. 2nd floor bedroom/bathroom)  Bathroom Shower/Tub:  (tub shower, shower chair, grab bars, handheld shower)  Prior Function:  Level of [place]:  (independent in ADLs PLOF, spouse does IADLs. patient and spouse both drive. sleeps in standard bed. no AD use PLOF. owns cane, crutches, walker. denies falls)    ADL:  ADL Comments: anticipate MAX A-Total assist for ADLs at this time without use of AE due to precautions       Bed Mobility/Transfers: Bed Mobility  Bed Mobility:  (completed supine <-->sit with SBA; increased time to complete)    Transfers  Transfer:  (completed STS with SBA with WW)      Functional Mobility:  Functional Mobility  Functional Mobility Performed:  (patient completed side steps next to bed with MIN A with WW)     Sensation:  Light Touch: No apparent  deficits  Strength:  Strength Comments: BUE ROM/MMT WFL for patient age      Outcome Measures:[facility] Daily Activity  Putting on and taking off regular lower body clothing: A lot  Bathing (including washing, rinsing, drying): A lot  Putting on and taking off regular upper body clothing: A little  Toileting, which includes using toilet, bedpan or urinal: A lot  Taking care of personal grooming such as brushing teeth: A little  Eating Meals: None  Daily Activity - Total Score: 16        Education Documentation  Body Mechanics, taught by Madalyn Whittaker OT at 10/16/2024  2:34 PM.  Learner: Patient  Readiness: Acceptance  Method: Explanation  Response: Verbalizes Understanding, Needs Reinforcement    ADL Training, taught by Madalyn Whittaker OT at 10/16/2024  2:34 PM.  Learner: Patient  Readiness: Acceptance  Method: Explanation  Response: Verbalizes Understanding, Needs Reinforcement    Precautions, taught by Madalyn Whittaker OT at 10/16/2024  2:34 PM.  Learner: Patient  Readiness: Acceptance  Method: Explanation  Response: Verbalizes Understanding, Needs Reinforcement    Education Comments  No comments found.        OP EDUCATION:       Goals:  Encounter Problems       Encounter Problems (Active)       OT Goals       STG- patient will complete LB dressing with SBA with use of ae/ad/dme prn (Progressing)       Start:  10/16/24    Expected End:  10/30/24            STG- patient will complete toileting with SBA with use of ae/ad/dme prn (Progressing)       Start:  10/16/24    Expected End:  10/30/24            STG- patient will complete transfers to/from bed, chair, commode,car at SBA with use of ae/ad/dme prn (Progressing)       Start:  10/16/24    Expected End:  10/30/24            STG- patient will complete simple mobility with SBA with use of ae/ad/dme prn (Progressing)       Start:  10/16/24    Expected End:  10/30/24            STG- patient will complete grooming with SBA with use of ae/ad/dme prn (Progressing)        Start:  10/16/24    Expected End:  10/30/24

## 2024-10-16 NOTE — ANESTHESIA POSTPROCEDURE EVALUATION
Patient: Maverick Foote    Procedure Summary       Date: 10/16/24 Room / Location: [facility] OR 06 / Virtual [facility] OR    Anesthesia Start: 0727 Anesthesia Stop: 1027    Procedure: LEFT TOTAL HIP REPLACEMENT WITH INTRA-OP PORTABLE X-RAY (Left: Hip) Diagnosis:       Arthritis of left hip      (Arthritis of left hip [M16.12])    Surgeons: Perry Ocampo MD Responsible Provider: Danni Aiken MD    Anesthesia Type: general ASA Status: 3            Anesthesia Type: general    Vitals Value Taken Time   /77 10/16/24 1029   Temp 36 10/16/24 1029   Pulse 96 10/16/24 1029   Resp 16 10/16/24 1029   SpO2 94 10/16/24 1029       Anesthesia Post Evaluation    Patient location during evaluation: PACU  Patient participation: waiting for patient participation  Level of consciousness: responsive to verbal stimuli  Pain management: adequate  Airway patency: patent  Cardiovascular status: acceptable  Respiratory status: acceptable  Hydration status: acceptable  Postoperative Nausea and Vomiting: none      No notable events documented.

## 2024-10-16 NOTE — PROGRESS NOTES
Physical Therapy    Physical Therapy Evaluation    Patient Name: Maverick Foote  MRN: [id number]  Today's Date: 10/16/2024   Time Calculation  Start Time: 1326  Stop Time: 1337  Time Calculation (min): 11 min  209/209-A    Assessment/Plan   PT Assessment  PT Assessment Results: Decreased strength, Impaired balance  Rehab Prognosis: Good  Evaluation/Treatment Tolerance: Patient tolerated treatment well  Medical Staff Made Aware: Yes  Strengths: Ability to acquire knowledge  End of Session Communication: Bedside nurse, PCT/NA/CTA  Assessment Comment: pt tolerated session. pt presented with decreased LE strength and balance. pt would benefit from low int therapy in order to return to PLOF.  End of Session Patient Position: Bed, 2 rail up, Alarm on (ice pack on L hip, call light in reach)  IP OR SWING BED PT PLAN  Inpatient or Swing Bed: Inpatient  PT Plan  Treatment/Interventions: Bed mobility, Transfer training, Gait training, Stair training, Balance training, Strengthening, Therapeutic exercise, Therapeutic activity  PT Plan: Ongoing PT  PT Frequency: Daily  PT Discharge Recommendations: Low intensity level of continued care  PT Recommended Transfer Status: Assist x1  PT - OK to Discharge: Yes (once medically cleared)    Subjective     Current Problem:  1. Preop testing  chlorhexidine (Peridex) 0.12 % solution    chlorhexidine (Hibiclens) 4 % external liquid      2. Pre-op testing  CBC    CBC    Coagulation Screen    Coagulation Screen    Basic Metabolic Panel    Basic Metabolic Panel    Type And Screen    Type And Screen    Hemoglobin A1c    Hemoglobin A1c      3. Abnormal coagulation profile  Coagulation Screen    Coagulation Screen      4. Abnormal finding of blood chemistry  Hemoglobin A1c    Hemoglobin A1c      5. Arthritis of left knee  amLODIPine (Norvasc) tablet 5 mg      6. Arthritis of left hip  Surgical Pathology Exam    Surgical Pathology Exam        Patient Active Problem List   Diagnosis    Arthritis  of knee, left    Left knee pain    Primary osteoarthritis of right hip    Right hip pain    Arthritis of left knee    HTN (hypertension)    Hyperlipidemia    Arthritis of left hip       General Visit Information:  General  Reason for Referral: L THR  Referred By: Terrence  Past Medical History Relevant to Rehab: 69 yr old male w/PHx of ADHD, HTN, HLD, GERD and OA of Left hip. surgical history: COLONOSCOPY , HIP ARTHROPLASTY, KNEE ARTHROPLASTY, LUMBAR LAMINECTOMY 1978 ,SPINAL FUSION ,TOTAL KNEE ARTHROPLASTY  Co-Treatment: OT  Co-Treatment Reason: for safety and to maximize therapeutic performance  Prior to Session Communication: Bedside nurse, PCT/NA/CTA  Patient Position Received: Bed, 2 rail up, Alarm off, not on at start of session (wife present)  General Comment: patient groggy, however, pleasant and cooperative to participate    Home Living:  Home Living  Home Living Comments: pt lives with wife. 3 BONITA with rail. bed and bath on 2nd floor. pt has tub shower with GBs, HHS, and raised toilet seat. pt owns cane and walker.    Prior Level of Function:  Prior Function Per Pt/Caregiver Report  Prior Function Comments: pt IND in ADLs/iADLs. pt amb without AD. pts wife completes cooking, cleaning, laundry. sleeps in flat bed. denies hx of falls.    Precautions:  Precautions  Medical Precautions:  (alarms, OOB with assist, FWB, Left posterior hip precautions, falls)       Objective     Pain:  Pain Assessment  0-10 (Numeric) Pain Score: 0 - No pain    Cognition:  Cognition  Overall Cognitive Status: Within Functional Limits    General Assessments:         Sensation  Light Touch: No apparent deficits  Strength  Strength Comments: BLE strength 4/5 grossly. hip flexion not assessed d/t recent surgery. BLE ROM WFL          Functional Assessments:     Bed Mobility  Bed Mobility:  (SBA for sup<>sitting, v/c for safety and sequencing. Increased time to complete)  Transfers  Transfer:  (completed STS with SBA with  WW)  Ambulation/Gait Training  Ambulation/Gait Training Performed:  (Arnulfo x1 fo side steps towards HOB with w/w. pt presented to be groggy. v/c for safety and mobility)          Outcome Measures:     [facility] Basic Mobility  Turning from your back to your side while in a flat bed without using bedrails: A little  Moving from lying on your back to sitting on the side of a flat bed without using bedrails: A little  Moving to and from bed to chair (including a wheelchair): A little  Standing up from a chair using your arms (e.g. wheelchair or bedside chair): A little  To walk in hospital room: A little  Climbing 3-5 steps with railing: A little  Basic Mobility - Total Score: 18           Goals:  Encounter Problems       Encounter Problems (Active)       PT Problem       STG - Pt will perform a B LE ther ex program of 2-3 sets of 10  (Progressing)       Start:  10/16/24    Expected End:  10/30/24            STG - Pt will transition supine <> sitting IND  (Progressing)       Start:  10/16/24    Expected End:  10/30/24            STG - Pt will amb 200' using w/w with Nino  (Progressing)       Start:  10/16/24    Expected End:  10/30/24            STG -  Pt will navigate 4 stairs using rail with SBA  (Progressing)       Start:  10/16/24    Expected End:  10/30/24               Pain - Adult            Education Documentation  Precautions, taught by Margo Escobar PT at 10/16/2024  3:03 PM.  Learner: Significant Other, Patient  Readiness: Acceptance  Method: Explanation  Response: Verbalizes Understanding    Mobility Training, taught by Margo Escobar PT at 10/16/2024  3:03 PM.  Learner: Significant Other, Patient  Readiness: Acceptance  Method: Explanation  Response: Verbalizes Understanding    Education Comments  No comments found.

## 2024-10-16 NOTE — ANESTHESIA PROCEDURE NOTES
Airway  Date/Time: 10/16/2024 7:40 AM  Urgency: elective    Airway not difficult    Staffing  Performed: CAA   Authorized by: Danni Aiken MD    Performed by: Danni Aiken MD  Patient location during procedure: OR    Indications and Patient Condition  Indications for airway management: anesthesia  Spontaneous Ventilation: absent  Sedation level: deep  Preoxygenated: yes  Patient position: sniffing  MILS maintained throughout  Mask difficulty assessment: 2 - vent by mask + OA or adjuvant +/- NMBA  Planned trial extubation    Final Airway Details  Final airway type: endotracheal airway      Successful airway: ETT  Cuffed: yes   Successful intubation technique: video laryngoscopy  Facilitating devices/methods: intubating stylet  Endotracheal tube insertion site: oral  Blade: Anderson  Blade size: #4  ETT size (mm): 7.5  Cormack-Lehane Classification: grade I - full view of glottis  Placement verified by: chest auscultation and capnometry   Cuff volume (mL): 10  Measured from: teeth  ETT to teeth (cm): 21  Number of attempts at approach: 1  Ventilation between attempts: none  Number of other approaches attempted: 0

## 2024-10-16 NOTE — ANESTHESIA PROCEDURE NOTES
Airway  Date/Time: 10/16/2024 7:39 AM  Urgency: elective    Airway not difficult    Staffing  Performed: KEVIN   Authorized by: Danni Aiken MD    Performed by: SONIA Short  Patient location during procedure: OR    Indications and Patient Condition  Indications for airway management: anesthesia  Sedation level: deep  Preoxygenated: yes  Patient position: sniffing  MILS maintained throughout  Mask difficulty assessment: 1 - vent by mask    Final Airway Details  Final airway type: endotracheal airway      Successful airway: ETT  Cuffed: yes   Successful intubation technique: direct laryngoscopy  Blade: Anderson  Blade size: #4  ETT size (mm): 7.5  Cormack-Lehane Classification: grade I - full view of glottis  Placement verified by: chest auscultation and capnometry   Measured from: lips  Number of attempts at approach: 1

## 2024-10-16 NOTE — ANESTHESIA PREPROCEDURE EVALUATION
Patient: Maverick Foote    Procedure Information       Date/Time: 10/16/24 0730    Procedure: LEFT TOTAL HIP REPLACEMENT WITH INTRA-OP PORTABLE X-RAY (Left: Hip)    Location: [facility] / [facility]    Surgeons: Perry Ocampo MD            Relevant Problems   Anesthesia (within normal limits)      Cardiac   (+) HTN (hypertension)   (+) Hyperlipidemia      Musculoskeletal   (+) Primary osteoarthritis of right hip       Clinical information reviewed:   Tobacco  Allergies  Meds   Med Hx  Surg Hx   Fam Hx  Soc Hx        NPO Detail:  NPO/Void Status  Date of Last Liquid: 10/15/24  Time of Last Liquid: 2000  Date of Last Solid: 10/15/24  Time of Last Solid: 2000  Last Intake Type: Food  Time of Last Void: 0646         Physical Exam    Airway  Mallampati: II  TM distance: >3 FB  Neck ROM: full     Cardiovascular - normal exam     Dental   (+) upper dentures     Pulmonary - normal exam     Abdominal - normal exam             Anesthesia Plan    History of general anesthesia?: yes  History of complications of general anesthesia?: no    ASA 3     general     The patient is not a current smoker.    intravenous induction   Postoperative administration of opioids is intended.  Trial extubation is planned.  Anesthetic plan and risks discussed with patient.  Use of blood products discussed with patient who consented to blood products.    Plan discussed with CAA.

## 2024-10-16 NOTE — CARE PLAN
The patient's goals for the shift include Pain control    The clinical goals for the shift include Pain control      Problem: Pain - Adult  Goal: Verbalizes/displays adequate comfort level or baseline comfort level  Outcome: Progressing  Flowsheets (Taken 10/16/2024 1224)  Verbalizes/displays adequate comfort level or baseline comfort level: Assess pain using appropriate pain scale     Problem: Safety - Adult  Goal: Free from fall injury  Outcome: Progressing  Flowsheets (Taken 10/16/2024 1224)  Free from fall injury: Instruct family/caregiver on patient safety     Problem: Discharge Planning  Goal: Discharge to home or other facility with appropriate resources  Outcome: Progressing  Flowsheets (Taken 10/16/2024 1224)  Discharge to home or other facility with appropriate resources: Arrange for needed discharge resources and transportation as appropriate     Problem: Chronic Conditions and Co-morbidities  Goal: Patient's chronic conditions and co-morbidity symptoms are monitored and maintained or improved  Outcome: Progressing  Flowsheets (Taken 10/16/2024 1224)  Care Plan - Patient's Chronic Conditions and Co-Morbidity Symptoms are Monitored and Maintained or Improved: Monitor and assess patient's chronic conditions and comorbid symptoms for stability, deterioration, or improvement     Problem: Skin  Goal: Decreased wound size/increased tissue granulation at next dressing change  Outcome: Progressing  Flowsheets (Taken 10/16/2024 1224)  Decreased wound size/increased tissue granulation at next dressing change: Promote sleep for wound healing  Goal: Participates in plan/prevention/treatment measures  Outcome: Progressing  Flowsheets (Taken 10/16/2024 1224)  Participates in plan/prevention/treatment measures: Discuss with provider PT/OT consult  Goal: Prevent/manage excess moisture  Outcome: Progressing  Flowsheets (Taken 10/16/2024 1224)  Prevent/manage excess moisture: Moisturize dry skin  Goal: Prevent/minimize  sheer/friction injuries  Outcome: Progressing  Flowsheets (Taken 10/16/2024 1224)  Prevent/minimize sheer/friction injuries:   HOB 30 degrees or less   Increase activity/out of bed for meals  Goal: Promote/optimize nutrition  Outcome: Progressing  Flowsheets (Taken 10/16/2024 1224)  Promote/optimize nutrition: Consume > 50% meals/supplements  Goal: Promote skin healing  Outcome: Progressing  Flowsheets (Taken 10/16/2024 1224)  Promote skin healing: Ensure correct size (line/device) and apply per  instructions     Problem: Fall/Injury  Goal: Not fall by end of shift  Outcome: Progressing  Goal: Be free from injury by end of the shift  Outcome: Progressing  Goal: Verbalize understanding of personal risk factors for fall in the hospital  Outcome: Progressing  Goal: Verbalize understanding of risk factor reduction measures to prevent injury from fall in the home  Outcome: Progressing  Goal: Use assistive devices by end of the shift  Outcome: Progressing  Goal: Pace activities to prevent fatigue by end of the shift  Outcome: Progressing     Problem: Pain  Goal: Takes deep breaths with improved pain control throughout the shift  Outcome: Progressing  Goal: Turns in bed with improved pain control throughout the shift  Outcome: Progressing  Goal: Walks with improved pain control throughout the shift  Outcome: Progressing  Goal: Performs ADL's with improved pain control throughout shift  Outcome: Progressing  Goal: Participates in PT with improved pain control throughout the shift  Outcome: Progressing  Goal: Free from opioid side effects throughout the shift  Outcome: Progressing  Goal: Free from acute confusion related to pain meds throughout the shift  Outcome: Progressing

## 2024-10-16 NOTE — OP NOTE
LEFT TOTAL HIP REPLACEMENT WITH INTRA-OP PORTABLE X-RAY (L) Operative Note     Date: 10/16/2024  OR Location: [facility]    Name: Maverick Foote, : 1955, Age: 69 y.o., MRN: [id number], Sex: male    Diagnosis  Pre-op Diagnosis      * Arthritis of left hip [M16.12] Post-op Diagnosis     * Arthritis of left hip [M16.12]     Procedures  LEFT TOTAL HIP REPLACEMENT WITH INTRA-OP PORTABLE X-RAY  09736 - DE ARTHRP ACETBLR/PROX FEM PROSTC AGRFT/ALGRFT  DePuy Bates 8 high femoral stem with a 36 mm +5 ceramic femoral head and [place] sector 56 mm acetabulum with +4 lateral neutral liner    Surgeons      * Perry Ocampo - Primary    Resident/Fellow/Other Assistant:    Rosa Mcfarlane.  Ilia    Procedure Summary  Anesthesia: General  ASA: III  Anesthesia Staff: Anesthesiologist: Danni Aiken MD  C-AA: SONIA Short  Estimated Blood Loss: 200 mL    Indications: The patient has undergone extensive conservative treatment but has persistent severe debilitating pain.  Radiographs demonstrate advanced degenerative changes involving the hip with complete loss of joint space and osteophyte formation subchondral sclerosis and cystic change.  Treatment options of any no treatment reviewed and the decision was made to proceed with surgery.     Description of procedure: The patient was brought in the operating room.  General anesthesia was performed formed.  Positioning was done in the lateral decubitus position.  The patient was prepped and draped in the usual fashion.  A posterolateral approach was made to the hip.  Care taken to identify and protect the sciatic nerve throughout the procedure.  The short external rotators and capsule were taken out and tagged for later repair.  The hip was dislocated.  There was advanced degenerative disease involving the hip.  Femoral neck cut was made with an oscillating saw based on preoperative templating.  The acetabulum was exposed.  Labrum and soft tissue were removed.   Sequential reaming was done and trialing was done for the acetabular implant.  The acetabular implant was then impacted into place matching the native anteversion and in approximately 45 degrees abduction.  A very secure fit was obtained.  Additional fixation was done using a cancellous screw.  Trial liner was placed and then attention was turned to the femur.  Sequential reaming and broaching was done.  Trialing was then done.  Intraoperative x-ray was obtained demonstrating good implant positioning and fill of the femoral canal as well as leg length restoration.  The hip was stable past 90 degrees of flexion.  The hip was stable in extension and external rotation as well as flexion and external rotation.  The hip was stable and flexion and internal rotation at 90 degrees as well as at 70 degrees.  The decision was made to use these implants.  Attention was again turned to the acetabulum.  The hole eliminator was placed followed by the liner.  Attention was again turned to the femur and the femoral implant was impacted into place matching native anteversion and then the femoral head was applied and the hip was reduced and had similar stability of the trial implants.  The wound was copiously with antibiotic irrigation.  Hemostasis was carefully obtained.  The wound was closed in layers.  Sterile dressing was applied and they were taken the recovery room in stable condition.  The appropriate preoperative antibiotic was given within 1 hour then skin incision and antibiotics were ordered postoperatively to be completed within 24 hours.  DVT prophylaxis was ordered to start within 24 hours.  There is no complications.  The femoral head was sent for specimen.         Anesthesia Record               Intraprocedure I/O Totals          Intake    LR bolus 1000.00 mL    Tranexamic Acid 0.00 mL    The total shown is the total volume documented since Anesthesia Start was filed.    ceFAZolin (Ancef) 2 g in dextrose (iso)   mL 100.00 mL    Total Intake 1100 mL       Output    Est. Blood Loss 350 mL    Total Output 350 mL       Net    Net Volume 750 mL          Specimen:   ID Type Source Tests Collected by Time   1 : left hip bone and tissue Tissue FEMORAL HEAD LEFT SURGICAL PATHOLOGY EXAM Perry Ocapmo MD 10/16/2024 0928        Staff:   Circulator: Lana Morfin Person: Bria          Implants:  Implants       Type Name Action Serial No.      Joint STOP POSITIVE APEX HOLE ELIM - [id number] Implanted      Joint Hip ACETABULAR CUP, SECTOR, GRIPTON, SIZE 56MM - [id number] Implanted      Screw SCREW CANCELLOUS 6.5 X 30 - [id number] Implanted      Joint Hip HIP STEM, SUMMIT DUOFIX 8 HI - [id number] Implanted      Joint Hip LINER, ALTRX, +4 NEUTRAL, 36 X 56MM - [id number] Implanted      Joint Hip FEMORAL HEAD, CERAMIC 36 +5 - [id number] Implanted             Attending Attestation: I performed the procedure.    Perry Ocampo  Phone Number: [phone]

## 2024-10-17 ENCOUNTER — DOCUMENTATION (OUTPATIENT)
Dept: HOME HEALTH SERVICES | Facility: HOME HEALTH | Age: 69
End: 2024-10-17
Payer: MEDICARE

## 2024-10-17 ENCOUNTER — HOME HEALTH ADMISSION (OUTPATIENT)
Dept: HOME HEALTH SERVICES | Facility: HOME HEALTH | Age: 69
End: 2024-10-17
Payer: MEDICARE

## 2024-10-17 ENCOUNTER — PHARMACY VISIT (OUTPATIENT)
Dept: PHARMACY | Facility: CLINIC | Age: 69
End: 2024-10-17
Payer: COMMERCIAL

## 2024-10-17 VITALS
SYSTOLIC BLOOD PRESSURE: 105 MMHG | BODY MASS INDEX: 32.15 KG/M2 | OXYGEN SATURATION: 93 % | RESPIRATION RATE: 18 BRPM | HEIGHT: 70 IN | DIASTOLIC BLOOD PRESSURE: 63 MMHG | HEART RATE: 72 BPM | WEIGHT: 224.58 LBS | TEMPERATURE: 97.3 F

## 2024-10-17 LAB
ANION GAP SERPL CALC-SCNC: 12 MMOL/L (ref 10–20)
BUN SERPL-MCNC: 20 MG/DL (ref 6–23)
CALCIUM SERPL-MCNC: 8.3 MG/DL (ref 8.6–10.3)
CHLORIDE SERPL-SCNC: 107 MMOL/L (ref 98–107)
CO2 SERPL-SCNC: 25 MMOL/L (ref 21–32)
CREAT SERPL-MCNC: 0.94 MG/DL (ref 0.5–1.3)
EGFRCR SERPLBLD CKD-EPI 2021: 87 ML/MIN/1.73M*2
ERYTHROCYTE [DISTWIDTH] IN BLOOD BY AUTOMATED COUNT: 14.1 % (ref 11.5–14.5)
GLUCOSE SERPL-MCNC: 112 MG/DL (ref 74–99)
HCT VFR BLD AUTO: 34.3 % (ref 41–52)
HGB BLD-MCNC: 11.1 G/DL (ref 13.5–17.5)
MCH RBC QN AUTO: 29.9 PG (ref 26–34)
MCHC RBC AUTO-ENTMCNC: 32.4 G/DL (ref 32–36)
MCV RBC AUTO: 93 FL (ref 80–100)
NRBC BLD-RTO: 0 /100 WBCS (ref 0–0)
PLATELET # BLD AUTO: 210 X10*3/UL (ref 150–450)
POTASSIUM SERPL-SCNC: 3.9 MMOL/L (ref 3.5–5.3)
RBC # BLD AUTO: 3.71 X10*6/UL (ref 4.5–5.9)
SODIUM SERPL-SCNC: 140 MMOL/L (ref 136–145)
WBC # BLD AUTO: 9.8 X10*3/UL (ref 4.4–11.3)

## 2024-10-17 PROCEDURE — RXMED WILLOW AMBULATORY MEDICATION CHARGE

## 2024-10-17 PROCEDURE — 7100000011 HC EXTENDED STAY RECOVERY HOURLY - NURSING UNIT

## 2024-10-17 PROCEDURE — 80048 BASIC METABOLIC PNL TOTAL CA: CPT | Performed by: ORTHOPAEDIC SURGERY

## 2024-10-17 PROCEDURE — 36415 COLL VENOUS BLD VENIPUNCTURE: CPT | Performed by: ORTHOPAEDIC SURGERY

## 2024-10-17 PROCEDURE — 97530 THERAPEUTIC ACTIVITIES: CPT | Mod: CO,GO

## 2024-10-17 PROCEDURE — 97110 THERAPEUTIC EXERCISES: CPT | Mod: CQ,GP

## 2024-10-17 PROCEDURE — 85027 COMPLETE CBC AUTOMATED: CPT | Performed by: ORTHOPAEDIC SURGERY

## 2024-10-17 PROCEDURE — 97535 SELF CARE MNGMENT TRAINING: CPT | Mod: CO,GO

## 2024-10-17 PROCEDURE — 97116 GAIT TRAINING THERAPY: CPT | Mod: CQ,GP

## 2024-10-17 PROCEDURE — 97535 SELF CARE MNGMENT TRAINING: CPT | Mod: CQ,GP

## 2024-10-17 PROCEDURE — 99239 HOSP IP/OBS DSCHRG MGMT >30: CPT | Performed by: CLINICAL NURSE SPECIALIST

## 2024-10-17 PROCEDURE — 2500000001 HC RX 250 WO HCPCS SELF ADMINISTERED DRUGS (ALT 637 FOR MEDICARE OP): Performed by: CLINICAL NURSE SPECIALIST

## 2024-10-17 PROCEDURE — 2500000001 HC RX 250 WO HCPCS SELF ADMINISTERED DRUGS (ALT 637 FOR MEDICARE OP): Performed by: ORTHOPAEDIC SURGERY

## 2024-10-17 PROCEDURE — 2500000004 HC RX 250 GENERAL PHARMACY W/ HCPCS (ALT 636 FOR OP/ED): Mod: JZ | Performed by: ORTHOPAEDIC SURGERY

## 2024-10-17 RX ORDER — DOCUSATE SODIUM 100 MG/1
100 CAPSULE, LIQUID FILLED ORAL DAILY
Qty: 14 CAPSULE | Refills: 0 | Status: SHIPPED | OUTPATIENT
Start: 2024-10-17 | End: 2024-10-31

## 2024-10-17 RX ORDER — ASPIRIN 81 MG/1
81 TABLET ORAL 2 TIMES DAILY
Qty: 60 TABLET | Refills: 0 | Status: SHIPPED | OUTPATIENT
Start: 2024-10-17 | End: 2024-11-16

## 2024-10-17 RX ORDER — POLYETHYLENE GLYCOL 3350 17 G/17G
17 POWDER, FOR SOLUTION ORAL DAILY PRN
Start: 2024-10-17

## 2024-10-17 RX ORDER — TRAMADOL HYDROCHLORIDE 50 MG/1
50 TABLET ORAL EVERY 6 HOURS PRN
Qty: 28 TABLET | Refills: 0 | Status: SHIPPED | OUTPATIENT
Start: 2024-10-17 | End: 2024-10-24

## 2024-10-17 RX ORDER — ACETAMINOPHEN 325 MG/1
650 TABLET ORAL EVERY 6 HOURS SCHEDULED
Start: 2024-10-17

## 2024-10-17 RX ORDER — OXYCODONE HYDROCHLORIDE 5 MG/1
5-10 TABLET ORAL EVERY 6 HOURS PRN
Qty: 28 TABLET | Refills: 0 | Status: SHIPPED | OUTPATIENT
Start: 2024-10-17 | End: 2024-10-24

## 2024-10-17 ASSESSMENT — PAIN DESCRIPTION - DESCRIPTORS
DESCRIPTORS: ACHING
DESCRIPTORS: ACHING
DESCRIPTORS: ACHING;NAGGING

## 2024-10-17 ASSESSMENT — PAIN - FUNCTIONAL ASSESSMENT
PAIN_FUNCTIONAL_ASSESSMENT: 0-10

## 2024-10-17 ASSESSMENT — COGNITIVE AND FUNCTIONAL STATUS - GENERAL
MOVING TO AND FROM BED TO CHAIR: A LITTLE
DAILY ACTIVITIY SCORE: 18
CLIMB 3 TO 5 STEPS WITH RAILING: A LITTLE
DAILY ACTIVITIY SCORE: 20
HELP NEEDED FOR BATHING: A LITTLE
TOILETING: A LITTLE
DRESSING REGULAR UPPER BODY CLOTHING: A LITTLE
MOBILITY SCORE: 18
TURNING FROM BACK TO SIDE WHILE IN FLAT BAD: A LITTLE
WALKING IN HOSPITAL ROOM: A LITTLE
TOILETING: A LITTLE
DRESSING REGULAR LOWER BODY CLOTHING: A LITTLE
PERSONAL GROOMING: A LITTLE
DRESSING REGULAR LOWER BODY CLOTHING: A LITTLE
PERSONAL GROOMING: A LITTLE
STANDING UP FROM CHAIR USING ARMS: A LITTLE
EATING MEALS: A LITTLE
HELP NEEDED FOR BATHING: A LITTLE
MOVING FROM LYING ON BACK TO SITTING ON SIDE OF FLAT BED WITH BEDRAILS: A LITTLE

## 2024-10-17 ASSESSMENT — PAIN SCALES - GENERAL
PAINLEVEL_OUTOF10: 2
PAINLEVEL_OUTOF10: 7
PAINLEVEL_OUTOF10: 0 - NO PAIN
PAINLEVEL_OUTOF10: 7
PAINLEVEL_OUTOF10: 2

## 2024-10-17 ASSESSMENT — ACTIVITIES OF DAILY LIVING (ADL): HOME_MANAGEMENT_TIME_ENTRY: 45

## 2024-10-17 NOTE — CARE PLAN
The patient's goals for the shift include Pain control    The clinical goals for the shift include HDS/safety/pain mngt      Problem: Pain - Adult  Goal: Verbalizes/displays adequate comfort level or baseline comfort level  Outcome: Progressing  Flowsheets (Taken 10/17/2024 0738)  Verbalizes/displays adequate comfort level or baseline comfort level:   Encourage patient to monitor pain and request assistance   Assess pain using appropriate pain scale     Problem: Safety - Adult  Goal: Free from fall injury  Outcome: Progressing  Flowsheets (Taken 10/17/2024 0738)  Free from fall injury: Instruct family/caregiver on patient safety     Problem: Discharge Planning  Goal: Discharge to home or other facility with appropriate resources  Outcome: Progressing  Flowsheets (Taken 10/17/2024 0738)  Discharge to home or other facility with appropriate resources: Identify barriers to discharge with patient and caregiver     Problem: Chronic Conditions and Co-morbidities  Goal: Patient's chronic conditions and co-morbidity symptoms are monitored and maintained or improved  Outcome: Progressing  Flowsheets (Taken 10/17/2024 0738)  Care Plan - Patient's Chronic Conditions and Co-Morbidity Symptoms are Monitored and Maintained or Improved: Collaborate with multidisciplinary team to address chronic and comorbid conditions and prevent exacerbation or deterioration     Problem: Skin  Goal: Decreased wound size/increased tissue granulation at next dressing change  Outcome: Progressing  Flowsheets (Taken 10/17/2024 0738)  Decreased wound size/increased tissue granulation at next dressing change: Promote sleep for wound healing  Goal: Participates in plan/prevention/treatment measures  Outcome: Progressing  Flowsheets (Taken 10/17/2024 0738)  Participates in plan/prevention/treatment measures: Discuss with provider PT/OT consult  Goal: Prevent/manage excess moisture  Outcome: Progressing  Flowsheets (Taken 10/17/2024 0738)  Prevent/manage  excess moisture: Moisturize dry skin  Goal: Prevent/minimize sheer/friction injuries  Outcome: Progressing  Flowsheets (Taken 10/17/2024 0738)  Prevent/minimize sheer/friction injuries: HOB 30 degrees or less  Goal: Promote/optimize nutrition  Outcome: Progressing  Flowsheets (Taken 10/17/2024 0738)  Promote/optimize nutrition:   Monitor/record intake including meals   Offer water/supplements/favorite foods  Goal: Promote skin healing  Outcome: Progressing  Flowsheets (Taken 10/17/2024 0738)  Promote skin healing: Protective dressings over bony prominences     Problem: Fall/Injury  Goal: Not fall by end of shift  Outcome: Progressing  Goal: Be free from injury by end of the shift  Outcome: Progressing  Goal: Verbalize understanding of personal risk factors for fall in the hospital  Outcome: Progressing  Goal: Verbalize understanding of risk factor reduction measures to prevent injury from fall in the home  Outcome: Progressing  Goal: Use assistive devices by end of the shift  Outcome: Progressing  Goal: Pace activities to prevent fatigue by end of the shift  Outcome: Progressing     Problem: Pain  Goal: Takes deep breaths with improved pain control throughout the shift  Outcome: Progressing  Goal: Turns in bed with improved pain control throughout the shift  Outcome: Progressing  Goal: Walks with improved pain control throughout the shift  Outcome: Progressing  Goal: Performs ADL's with improved pain control throughout shift  Outcome: Progressing  Goal: Participates in PT with improved pain control throughout the shift  Outcome: Progressing  Goal: Free from opioid side effects throughout the shift  Outcome: Progressing  Goal: Free from acute confusion related to pain meds throughout the shift  Outcome: Progressing

## 2024-10-17 NOTE — DISCHARGE SUMMARY
Discharge Diagnosis  Arthritis of left hip  Obesity with BMI 32.22    Issues Requiring Follow-Up  None     Test Results Pending At Discharge  Pending Labs       Order Current Status    Surgical Pathology Exam In process            Hospital Course   Patient is a 69 year old male with severe osteoarthritis of left hip.  On 10/16/24, patient underwent uncomplicated left total hip replacement via posterior approach by Dr Ocampo.  General anesthesia was utilized and estimated blood loss intraoperatively was 200cc.  Patient received one dose of IV antibiotics preoperatively and 2 additional doses were administered post operatively.  Hospitalist service was placed on consult to help assist with post op medical management.  For DVT prophylaxis, patient was ordered aspirin 81mg twice a day and bilateral SCDs.  Physical and occupational therapy were initiated with full weight bearing status and standard posterior hip precautions.  Patient progressed well with therapy.  His hemoglobin level was stable at 11.1 and he remained afebrile.  On POD #1, patient was medically stable and safe for discharge home with [facility] follow up.  At time of discharge, patient was ambulating 100 feet with use of wheeled walker and stand by assistance.  He was also able to demonstrate safe stair climbing with contact guard assist.  He was instructed to continue to take aspirin 81mg twice daily for total of 30 days at home. In addition, bilateral scarlett hose compression stockings were provided along with instructions to wear at home for 4 weeks.  For pain management, oxycodone and tramadol scripts were given.  Anticipate need for extended use of narcotic medications as well as higher MED requirements based on usual post operative incisional pain following total joint replacement surgery.  Patient will follow up in office with Dr Ocampo in 4 weeks.         Pertinent Physical Exam At Time of Discharge  Physical Exam  Musculoskeletal:      Comments:  Neurovascular status WNL LLE  Minimal amount of edema present to left hip/thigh   Skin:     Comments: Mepilex dressing D&I to left hip incision   Neurological:      Mental Status: He is alert.         Home Medications     Medication List      START taking these medications     aspirin 81 mg EC tablet; Take 1 tablet (81 mg) by mouth 2 times a day   for 60 doses.   docusate sodium 100 mg capsule; Commonly known as: Colace; Take 1   capsule (100 mg) by mouth once daily for 14 days.   oxyCODONE 5 mg immediate release tablet; Commonly known as: Roxicodone;   Take 1-2 tablets (5-10 mg) by mouth every 6 hours if needed for severe   pain (7 - 10) for up to 7 days.   polyethylene glycol 17 gram packet; Commonly known as: Glycolax,   Miralax; Take 17 g by mouth once daily as needed (take dose if you do NOT   have bowel movment by Shahab Oct 20th).   traMADol 50 mg tablet; Commonly known as: Ultram; Take 1 tablet (50 mg)   by mouth every 6 hours if needed for moderate pain (4 - 6) for up to 7   days.     CHANGE how you take these medications     acetaminophen 325 mg tablet; Commonly known as: Tylenol; Take 2 tablets   (650 mg) by mouth every 6 hours.; What changed: how much to take, when to   take this     CONTINUE taking these medications     albuterol 90 mcg/actuation inhaler   amLODIPine 5 mg tablet; Commonly known as: Norvasc   methylphenidate 20 mg tablet; Commonly known as: Ritalin   pantoprazole 40 mg EC tablet; Commonly known as: ProtoNix   rosuvastatin 20 mg tablet; Commonly known as: Crestor     STOP taking these medications     diclofenac 75 mg EC tablet; Commonly known as: Voltaren   HYDROcodone-acetaminophen 5-325 mg tablet; Commonly known as: Norco   rivaroxaban 10 mg tablet; Commonly known as: Xarelto       Outpatient Follow-Up  Future Appointments   Date Time Provider Department Center   10/18/2024 To Be Determined Zo Zamudio RN [facility]   10/19/2024 To Be Determined Danay Coburn OT [facility]    10/19/2024 To Be Determined Forrest Turner, PT [facility]   11/22/2024 11:00 AM Perry Ocampo MD [id number] [place]       *I spent 40 minutes in the professional role and overall care of this patient.    Mariluz Avalos, APRN-CNS

## 2024-10-17 NOTE — PROGRESS NOTES
Physical Therapy    Physical Therapy Treatment    Patient Name: Maverick Foote  MRN: [id number]  Department:   Room: [address] [place] [address]  Today's Date: 10/17/2024  Time Calculation  Start Time: 1031  Stop Time: 1131  Time Calculation (min): 60 min         Assessment/Plan         PT Plan  Treatment/Interventions: Bed mobility, Transfer training, Gait training, Stair training, Balance training, Strengthening, Therapeutic exercise, Therapeutic activity  PT Plan: Ongoing PT  PT Frequency: Daily  PT Discharge Recommendations: Low intensity level of continued care  PT Recommended Transfer Status: Assist x1  PT - OK to Discharge: Yes (once medically cleared)      General Visit Information:   PT  Visit  PT Received On: 10/17/24       Subjective                Objective   Pain:   5/10 left hip                       Treatments:  Therapeutic Exercise  Therapeutic Exercise Performed:  (supine lle ap's,qs,gs, hs,abd,saqs   sitting  heelslides x 20 reps   cold pack to left hip post rx)    Bed Mobility  Bed Mobility:  (supine to sit and sit to supine sba)    Ambulation/Gait Training  Ambulation/Gait Training Performed:  (ambualted 100 feet x 2 using fixed wheeled walker sba)  Transfers  Transfer:  (sit to stand sba)    Stairs  Stairs:  (ascended/descended 4 steps using 2 rails cga   ascended/descended  6 inch platform step with walker cga    ascended/descended 4 steps using one rail and stnd cane cga)    Outcome Measures:       Education Documentation  Mobility Training, taught by Germaine Kennedy PTA at 10/17/2024  3:00 PM.  Learner: Patient  Readiness: Acceptance  Method: Demonstration  Response: Demonstrated Understanding    Education Comments  No comments found.             Encounter Problems       Encounter Problems (Resolved)       PT Problem       STG - Pt will perform a B LE ther ex program of 2-3 sets of 10  (Adequate for Discharge)       Start:  10/16/24    Expected End:  10/30/24    Resolved:  10/17/24         STG - Pt will  transition supine <> sitting IND  (Adequate for Discharge)       Start:  10/16/24    Expected End:  10/30/24    Resolved:  10/17/24         STG - Pt will amb 200' using w/w with Nino  (Adequate for Discharge)       Start:  10/16/24    Expected End:  10/30/24    Resolved:  10/17/24         STG -  Pt will navigate 4 stairs using rail with SBA  (Adequate for Discharge)       Start:  10/16/24    Expected End:  10/30/24    Resolved:  10/17/24            Pain - Adult

## 2024-10-17 NOTE — PROGRESS NOTES
Occupational Therapy    OT Treatment    Patient Name: Maverick Foote  MRN: [id number]  Department:   Room: [address] [place] [address]  Today's Date: 10/17/2024  Time Calculation  Start Time: 0845  Stop Time: 1000  Time Calculation (min): 75 min        Assessment:  End of Session Communication: Bedside nurse, PCT/EDD/COLBY  End of Session Patient Position: Alarm on, Up in chair     Plan:  Treatment Interventions: ADL retraining, Functional transfer training, UE strengthening/ROM, Endurance training, Equipment evaluation/education, Compensatory technique education, Fine motor coordination activities  OT Frequency: 5 times per week  OT Discharge Recommendations: Low intensity level of continued care (with 24 hr support)  OT - OK to Discharge: Yes (once medically appropriate to next level of care)  Treatment Interventions: ADL retraining, Functional transfer training, UE strengthening/ROM, Endurance training, Equipment evaluation/education, Compensatory technique education, Fine motor coordination activities    Subjective   Previous Visit Info:  OT Last Visit  OT Received On: 10/17/24  General:  General  Prior to Session Communication: Bedside nurse, PCT/EDD/COLBY  Patient Position Received: Up in chair, Alarm off, not on at start of session  Precautions:  LE Weight Bearing Status:  (full weight bearing)  Medical Precautions: Fall precautions  Post-Surgical Precautions: Left posterior hip precautions     Pain:  Pain Assessment  Pain Assessment: 0-10  0-10 (Numeric) Pain Score: 0 - No pain    Objective      Activities of Daily Living: Grooming  Grooming Comments: declined oral care    UE Bathing  UE Bathing Level of Assistance: Independent    UE Dressing  UE Dressing Level of Assistance: Independent    LE Dressing  LE Dressing: Yes  LE Dressing Adaptive Equipment: Reacher, Dressing stick, Shoe horn  Pants Level of Assistance:  (SBA)  Shoe Level of Assistance:  (SBA)  LE Dressing Where Assessed: Recliner  LE Dressing Comments: dependent for  scarlett arana in order to maintain hip precautions    Toileting  Toileting Level of Assistance:  (SBA)     Bed Mobility/Transfers: Transfers  Transfer:  (sit to stand transfers at supervision)    Toilet Transfers  Toilet Transfer From: Recliner  Toilet Transfer Type: To and from  Toilet Transfer to: Raised toilet seat with rails  Toilet Transfer Technique: Ambulating  Toilet Transfers:  (SBA)     Functional Mobility:  Functional Mobility  Functional Mobility Performed:  (via a rolling walker completes simple functional mobility tasks in room at SBA)  Car Transfers: Educated patient verbally and with demonstration car transfer technique using sit and turn method and use of the crook of a cane as a leg  to bring LLE into car. Patient expressed good understanding and asked appropriate questions.    Outcome Measures:[facility] Daily Activity  Putting on and taking off regular lower body clothing: A little  Bathing (including washing, rinsing, drying): A little  Putting on and taking off regular upper body clothing: None  Toileting, which includes using toilet, bedpan or urinal: A little  Taking care of personal grooming such as brushing teeth: A little  Eating Meals: None  Daily Activity - Total Score: 20  EDUCATION:  Educated patient regarding total hip precautions, walker safety, use of adaptive equipment with patient demonstrating and expressing good understanding.     Goals:  Encounter Problems       Encounter Problems (Resolved)       OT Goals       STG- patient will complete LB dressing with SBA with use of ae/ad/dme prn (Adequate for Discharge)       Start:  10/16/24    Expected End:  10/30/24    Resolved:  10/17/24         STG- patient will complete toileting with SBA with use of ae/ad/dme prn (Adequate for Discharge)       Start:  10/16/24    Expected End:  10/30/24    Resolved:  10/17/24         STG- patient will complete transfers to/from bed, chair, commode,car at A with use of ae/ad/dme prn (Adequate for  Discharge)       Start:  10/16/24    Expected End:  10/30/24    Resolved:  10/17/24         STG- patient will complete simple mobility with SBA with use of ae/ad/dme prn (Adequate for Discharge)       Start:  10/16/24    Expected End:  10/30/24    Resolved:  10/17/24         STG- patient will complete grooming with SBA with use of ae/ad/dme prn (Adequate for Discharge)       Start:  10/16/24    Expected End:  10/30/24    Resolved:  10/17/24

## 2024-10-17 NOTE — PROGRESS NOTES
Discussed discharge plan with patient.  For discharge home today with [facility] follow up.  Patient would like to use same agency as last admission in 12/2023 which was [facility].  Referral placed for SOC tomorrow.    Reviewed discharge instructions with patient.  Patient verbalizes understanding of activity, wound care, pain management, home going medications and follow up care.  [facility] [employer] form, Posterior hip precautions handout and pain medication administration form provided to patient.

## 2024-10-17 NOTE — DISCHARGE INSTRUCTIONS
PAIN MANAGEMENT AT HOME:  To provide the best pain control over the next few days when pain will be at it's worst, we suggest you continue to take the following medications routinely and separately to provide the best pain management.  In addition, apply ice VERY FREQUENTLY to incision.   Also use some type of alternative intervention such as music therapy, relaxation techniques, or an type of diversion (such as watching television or talking to a friend) to help control pain.         ROUTINE MEDICATIONS:            TAKE TRAMADOL EVERY 6 HOURS               TAKE TYLENOL EVERY 8 HOURS     In between, take oxycodone as needed for breakthrough pain.  DO NOT TAKE OXYCODONE AND TRAMADOL AT SAME TIME!!!!    Also, do NOT take more than 4000mg of acetaminophen (tylenol) in 24 hours.        CONSTIPATION MANAGEMENT AT HOME:  Constipation is common after Joint Replacement surgery for several reasons.  A common side effect of all pain medication is constipation.  A decrease in your activity as well as change in your normal diet & appetite all contribute to the constipation.  At home, it is important to take a daily stool softener such as Colace, Milk of magnesium or senokot while you are taking pain medications to help manage the constipation.  In addition, if you do NOT have a bowel movement within 72 hours of discharge, add a laxative such as miralax.  Miralax normally takes 2 to 3 days to work so you will not receive immediate results.  It is also important to drink plenty of fluids, especially water.  Stool softeners & laxatives need water to work properly.  We also suggest you increase your fiber intake either with foods high in fiber or with some type of supplement such as benefiber or metamucil.    Foods that are high in fiber include:     Fruits such as pears, strawberries, avocado, apples, raspberries, bananas, kiwis   Vegetables such as carrots, beets, broccoli, brussel sprouts, spinach   Lentils and kidney beans    Split peas and chickpeas   Oats, almonds, coretta seeds, and sweet potatoes      ACTIVITY AT HOME:  You will need to continue with your therapy after discharge either with in home therapy or at an outpatient facility.  Most patients initially do in home therapy for about two weeks then transition to outpatient therapy.  You have freedom of choice in which in home therapy and outpatient facility you plan to use.  Most in home therapy sessions will begin within 24 to 48 hours after discharge.  After about two weeks of in home therapy, you will most likely to ready for outpatient therapy sessions.  Outpatient therapy is normally twice a week for weeks.  While you are at home it is important that you perform the exercises the therapist went over with you in the hospital 2 to 3 times a day.  After you complete your exercises, apply ice to your incision to help with swelling and pain.  You should also be getting up and walking around your house every hour with the use of your walker.  While at rest, perform, ankle pumps on each foot at least ten times.  This will help with the swelling as well as decrease your risk for blood clots.  ALWAYS USE YOUR WALKER WITH ANY TYPE OF ACTIVITY!!!!    WOUND CARE:  The bandage on your incision will stay in place for 7 days.  The bandage is waterproof so you may shower with the bandage in place.  No need to wrap or cover it.  Some drainage on your bandage is perfectly normal.  Home health care will assist you with bandage removal.  Once the bandage is removed, your incision can be left open to air if there is no drainage present.  If your incision is draining at the time of bandage removal, home health care will assist you with applying a new gauze bandage.  Gauze bandages are NOT waterproof.    Swelling in your operative extremity will peak about 72 hours after surgery and can last for weeks.  To help with the swelling make sure you are elevating your leg and apply ice frequently.  In  addition, you will receive compression stockings upon discharge from the hospital.  Make sure you are wearing these stockings on both your legs at home.  Generally we instruct you to wear during the day and remove at bedtime for the next 4 weeks.      *Apply ice to incision at least 5 times daily    *Wear bilateral scarlett hose stockings to lower extremities for next 4 weeks    *Do NOT take any non steroidal anti-inflammatory medications such as motrin, aleve, ibuprofen, celebrex or meloxicam    *Take daily stool softener.  If you experience any diarrhea, stop medication    *If you need a refill on your pain medication, contact your surgeon's office Monday through Thursday with as least 24 hours notice    If you have any questions or concerns please contact the Joint  Mariluz Avalos at:  Office: [phone]   Email:  [email or web address]

## 2024-10-17 NOTE — HH CARE COORDINATION
Home Care received an Ortho Referral for Nursing, Physical Therapy, and Occupational Therapy. We have processed the referral for a Start of Care on 10.18.2024.     If you have any questions or concerns, please feel free to contact us at [phone]. Follow the prompts, enter your five digit zip code, and you will be directed to your care team on [facility] 3.

## 2024-10-17 NOTE — PROGRESS NOTES
"Maverick Foote is a 69 y.o. male on day 0 of admission presenting with Arthritis of left hip.    Subjective   Patient c/o moderate amount of incisional pain  Denies any c/o nausea tolerating regular diet well        Objective     Physical Exam    Last Recorded Vitals  Blood pressure 103/66, pulse 75, temperature 37.1 °C (98.8 °F), temperature source Temporal, resp. rate 16, height 1.778 m (5' 10\"), weight 102 kg (224 lb 9.3 oz), SpO2 95%.  Intake/Output last 3 Shifts:  I/O last 3 completed shifts:  In: 4450.7 (43.7 mL/kg) [P.O.:1080; I.V.:2170.7 (21.3 mL/kg); IV Piggyback:1200]  Out: 1650 (16.2 mL/kg) [Urine:1300 (0.4 mL/kg/hr); Blood:350]  Weight: 101.9 kg     Relevant Results      Scheduled medications  acetaminophen, 650 mg, oral, q6h ARIES  aspirin, 81 mg, oral, BID  docusate sodium, 100 mg, oral, BID  polyethylene glycol, 17 g, oral, Daily  traMADol, 50 mg, oral, q6h      Continuous medications  lactated Ringer's, 20 mL/hr, Last Rate: 20 mL/hr (10/16/24 1709)  lactated Ringer's, 100 mL/hr, Last Rate: 100 mL/hr (10/16/24 1709)  oxygen, 2 L/min      PRN medications  PRN medications: diphenhydrAMINE, diphenhydrAMINE, HYDROmorphone, naloxone, ondansetron ODT **OR** ondansetron, oxyCODONE, oxyCODONE, oxyCODONE-acetaminophen, promethazine **OR** promethazine  Results for orders placed or performed during the hospital encounter of 10/16/24 (from the past 24 hours)   CBC   Result Value Ref Range    WBC 9.8 4.4 - 11.3 x10*3/uL    nRBC 0.0 0.0 - 0.0 /100 WBCs    RBC 3.71 (L) 4.50 - 5.90 x10*6/uL    Hemoglobin 11.1 (L) 13.5 - 17.5 g/dL    Hematocrit 34.3 (L) 41.0 - 52.0 %    MCV 93 80 - 100 fL    MCH 29.9 26.0 - 34.0 pg    MCHC 32.4 32.0 - 36.0 g/dL    RDW 14.1 11.5 - 14.5 %    Platelets 210 150 - 450 x10*3/uL                            Assessment/Plan   Assessment & Plan  Arthritis of left hip     Continue therapy PT with 100% WBS and OT with standard posterior hip precautions.  Plan discharge home with C later today " if patient safe and medically stable  Begin aspirin 81mg twice a day this am.  Bilateral SCDs to be worn while patient in bed.  Encouraged ankle pumps Q1 WA.  Continue routine tylenol & tramadol with ice therapy and PRN oxycodone for adequate pain control.  Patient's hemoglobin level stable this am at 11.1.  BMP results pending.  Will monitor            MELLY Lopes-CNS

## 2024-10-17 NOTE — CARE PLAN
The patient's goals for the shift include Pain control    The clinical goals for the shift include Pain control    Problem: Pain - Adult  Goal: Verbalizes/displays adequate comfort level or baseline comfort level  Outcome: Progressing     Problem: Safety - Adult  Goal: Free from fall injury  Outcome: Progressing     Problem: Discharge Planning  Goal: Discharge to home or other facility with appropriate resources  Outcome: Progressing     Problem: Chronic Conditions and Co-morbidities  Goal: Patient's chronic conditions and co-morbidity symptoms are monitored and maintained or improved  Outcome: Progressing     Problem: Skin  Goal: Decreased wound size/increased tissue granulation at next dressing change  Outcome: Progressing  Goal: Participates in plan/prevention/treatment measures  Outcome: Progressing  Goal: Prevent/manage excess moisture  Outcome: Progressing  Goal: Prevent/minimize sheer/friction injuries  Outcome: Progressing  Goal: Promote/optimize nutrition  Outcome: Progressing  Goal: Promote skin healing  Outcome: Progressing     Problem: Fall/Injury  Goal: Not fall by end of shift  Outcome: Progressing  Goal: Be free from injury by end of the shift  Outcome: Progressing  Goal: Verbalize understanding of personal risk factors for fall in the hospital  Outcome: Progressing  Goal: Verbalize understanding of risk factor reduction measures to prevent injury from fall in the home  Outcome: Progressing  Goal: Use assistive devices by end of the shift  Outcome: Progressing  Goal: Pace activities to prevent fatigue by end of the shift  Outcome: Progressing     Problem: Pain  Goal: Takes deep breaths with improved pain control throughout the shift  Outcome: Progressing  Goal: Turns in bed with improved pain control throughout the shift  Outcome: Progressing  Goal: Walks with improved pain control throughout the shift  Outcome: Progressing  Goal: Performs ADL's with improved pain control throughout shift  Outcome:  Progressing  Goal: Participates in PT with improved pain control throughout the shift  Outcome: Progressing  Goal: Free from opioid side effects throughout the shift  Outcome: Progressing  Goal: Free from acute confusion related to pain meds throughout the shift  Outcome: Progressing

## 2024-10-18 ENCOUNTER — HOME CARE VISIT (OUTPATIENT)
Dept: HOME HEALTH SERVICES | Facility: HOME HEALTH | Age: 69
End: 2024-10-18
Payer: MEDICARE

## 2024-10-18 ENCOUNTER — HOME CARE VISIT (OUTPATIENT)
Dept: HOME HEALTH SERVICES | Facility: HOME HEALTH | Age: 69
End: 2024-10-18

## 2024-10-18 ENCOUNTER — TELEPHONE (OUTPATIENT)
Dept: ORTHOPEDICS | Facility: HOSPITAL | Age: 69
End: 2024-10-18

## 2024-10-18 PROCEDURE — 169592 NO-PAY CLAIM PROCEDURE

## 2024-10-18 PROCEDURE — G0299 HHS/HOSPICE OF RN EA 15 MIN: HCPCS | Mod: HHH

## 2024-10-18 SDOH — ECONOMIC STABILITY: FOOD INSECURITY: MEALS PER DAY: 3

## 2024-10-18 ASSESSMENT — ENCOUNTER SYMPTOMS
CHANGE IN APPETITE: UNCHANGED
LOWEST PAIN SEVERITY IN PAST 24 HOURS: 0/10
APPETITE LEVEL: FAIR
HIGHEST PAIN SEVERITY IN PAST 24 HOURS: 0/10
PAIN: 1
PERSON REPORTING PAIN: PATIENT
PAIN SEVERITY GOAL: 0/10
PAIN LOCATION: GENERALIZED
SUBJECTIVE PAIN PROGRESSION: UNCHANGED

## 2024-10-18 ASSESSMENT — ACTIVITIES OF DAILY LIVING (ADL)
AMBULATION ASSISTANCE: ONE PERSON
ENTERING_EXITING_HOME: TOTAL DEPENDENCE
CURRENT_FUNCTION: ONE PERSON

## 2024-10-18 ASSESSMENT — LIFESTYLE VARIABLES: SMOKING_STATUS: 0

## 2024-10-19 ENCOUNTER — HOME CARE VISIT (OUTPATIENT)
Dept: HOME HEALTH SERVICES | Facility: HOME HEALTH | Age: 69
End: 2024-10-19
Payer: MEDICARE

## 2024-10-19 VITALS
OXYGEN SATURATION: 100 % | RESPIRATION RATE: 20 BRPM | DIASTOLIC BLOOD PRESSURE: 60 MMHG | TEMPERATURE: 98.8 F | HEART RATE: 95 BPM | SYSTOLIC BLOOD PRESSURE: 110 MMHG

## 2024-10-19 VITALS — RESPIRATION RATE: 14 BRPM

## 2024-10-19 PROCEDURE — G0151 HHCP-SERV OF PT,EA 15 MIN: HCPCS | Mod: HHH

## 2024-10-19 PROCEDURE — G0152 HHCP-SERV OF OT,EA 15 MIN: HCPCS | Mod: HHH

## 2024-10-19 SDOH — HEALTH STABILITY: PHYSICAL HEALTH: PHYSICAL EXERCISE: SIT, STAND, SUPINE

## 2024-10-19 SDOH — HEALTH STABILITY: PHYSICAL HEALTH: EXERCISE ACTIVITY: HIP 3WAY, HS, TKE, QS, HEEL RAISES, KNEE BENDS

## 2024-10-19 SDOH — HEALTH STABILITY: PHYSICAL HEALTH: PHYSICAL EXERCISE: 15

## 2024-10-19 SDOH — HEALTH STABILITY: PHYSICAL HEALTH: EXERCISE ACTIVITIES SETS: 2

## 2024-10-19 SDOH — HEALTH STABILITY: PHYSICAL HEALTH: EXERCISE TYPE: THA

## 2024-10-19 ASSESSMENT — ENCOUNTER SYMPTOMS
LOWEST PAIN SEVERITY IN PAST 24 HOURS: 2/10
MUSCLE WEAKNESS: 1
PAIN: 1
MUSCLE WEAKNESS: 1
PAIN LOCATION - PAIN QUALITY: ACHY
HIGHEST PAIN SEVERITY IN PAST 24 HOURS: 6/10
PAIN LOCATION - PAIN QUALITY: ACHE
PAIN LOCATION - EXACERBATING FACTORS: ROM
PAIN LOCATION - PAIN DURATION: MIN
LIMITED RANGE OF MOTION: 1
PAIN LOCATION - PAIN SEVERITY: 0/10
PAIN LOCATION - RELIEVING FACTORS: MEDS
PAIN LOCATION: LEFT HIP
PAIN LOCATION - PAIN SEVERITY: 6/10
SUBJECTIVE PAIN PROGRESSION: WAXING AND WANING
PAIN LOCATION - PAIN FREQUENCY: INTERMITTENT
PAIN LOCATION - PAIN FREQUENCY: INTERMITTENT
PAIN SEVERITY GOAL: 0/10
PERSON REPORTING PAIN: PATIENT

## 2024-10-19 ASSESSMENT — ACTIVITIES OF DAILY LIVING (ADL)
CURRENT_FUNCTION: INDEPENDENT
AMBULATION ASSISTANCE ON FLAT SURFACES: 1
PHYSICAL TRANSFERS ASSESSED: 1
AMBULATION_DISTANCE/DURATION_TOLERATED: 80 FT
BATHING ASSESSED: 1
BATHING_CURRENT_FUNCTION: SUPERVISION
OASIS_M1830: 03

## 2024-10-19 NOTE — CASE COMMUNICATION
Ot home care evaluation  and treatment completed. Goals met at evaluation. Patient with prior hip and knee replacement demonstrates good recall for hip precautions and denies need for reinstruciton for LH tools with spouse able to assist with Joana hose donning, supervision for ADL's functional mobility and transfers. WNL for vital signs. Verbalizes understanding of s/s to report vs. call 911. INstructed in strategies to reduuce caregiver  difficulty with Joana hose donning using plastic bag and folover method. All safety devices in place. No further OT intervention requested

## 2024-10-22 ENCOUNTER — HOME CARE VISIT (OUTPATIENT)
Dept: HOME HEALTH SERVICES | Facility: HOME HEALTH | Age: 69
End: 2024-10-22
Payer: MEDICARE

## 2024-10-22 VITALS
TEMPERATURE: 98.8 F | DIASTOLIC BLOOD PRESSURE: 60 MMHG | OXYGEN SATURATION: 97 % | HEART RATE: 78 BPM | RESPIRATION RATE: 20 BRPM | SYSTOLIC BLOOD PRESSURE: 110 MMHG

## 2024-10-22 PROCEDURE — G0299 HHS/HOSPICE OF RN EA 15 MIN: HCPCS | Mod: HHH

## 2024-10-22 SDOH — ECONOMIC STABILITY: FOOD INSECURITY: MEALS PER DAY: 3

## 2024-10-22 SDOH — ECONOMIC STABILITY: GENERAL

## 2024-10-22 ASSESSMENT — ENCOUNTER SYMPTOMS
MUSCLE WEAKNESS: 1
PERSON REPORTING PAIN: PATIENT
PAIN SEVERITY GOAL: 0/10
SUBJECTIVE PAIN PROGRESSION: UNCHANGED
HIGHEST PAIN SEVERITY IN PAST 24 HOURS: 0/10
PAIN: 1
APPETITE LEVEL: FAIR
LOWEST PAIN SEVERITY IN PAST 24 HOURS: 0/10
PAIN LOCATION: GENERALIZED
CHANGE IN APPETITE: UNCHANGED

## 2024-10-22 ASSESSMENT — ACTIVITIES OF DAILY LIVING (ADL)
CURRENT_FUNCTION: ONE PERSON
MONEY MANAGEMENT (EXPENSES/BILLS): INDEPENDENT
AMBULATION ASSISTANCE: ONE PERSON

## 2024-10-23 ENCOUNTER — HOME CARE VISIT (OUTPATIENT)
Dept: HOME HEALTH SERVICES | Facility: HOME HEALTH | Age: 69
End: 2024-10-23
Payer: MEDICARE

## 2024-10-25 ENCOUNTER — HOME CARE VISIT (OUTPATIENT)
Dept: HOME HEALTH SERVICES | Facility: HOME HEALTH | Age: 69
End: 2024-10-25
Payer: MEDICARE

## 2024-10-26 ASSESSMENT — ENCOUNTER SYMPTOMS
PAIN LOCATION - PAIN FREQUENCY: INTERMITTENT
PAIN LOCATION - PAIN SEVERITY: 0/10
PAIN LOCATION - PAIN QUALITY: ACHY

## 2024-10-29 ENCOUNTER — HOME CARE VISIT (OUTPATIENT)
Dept: HOME HEALTH SERVICES | Facility: HOME HEALTH | Age: 69
End: 2024-10-29
Payer: MEDICARE

## 2024-10-29 PROCEDURE — G0151 HHCP-SERV OF PT,EA 15 MIN: HCPCS | Mod: HHH

## 2024-10-29 SDOH — HEALTH STABILITY: PHYSICAL HEALTH: EXERCISE TYPE: THA PROTOCOL

## 2024-10-29 SDOH — HEALTH STABILITY: PHYSICAL HEALTH: EXERCISE COMMENTS: D MARCHING

## 2024-10-29 SDOH — HEALTH STABILITY: PHYSICAL HEALTH: EXERCISE ACTIVITY: THA PROTOCOL

## 2024-10-29 SDOH — HEALTH STABILITY: PHYSICAL HEALTH: PHYSICAL EXERCISE: 20

## 2024-10-29 SDOH — HEALTH STABILITY: PHYSICAL HEALTH: EXERCISE ACTIVITIES SETS: 2

## 2024-10-29 SDOH — HEALTH STABILITY: PHYSICAL HEALTH
EXERCISE COMMENTS: THA PROTOCOL INCLUDES THE FOLLOWING:  SUPINE- ANKLE PUMPS, QUAD SETS, GLUTEAL SETS, HIP FLEXION, HIP ABDUCTION, AND SAQS  SEATED- LAQS, ANKLE PUMPS, AND HIP ABDUCTION  STANDING- CALF RAISES, KNEE FLEXION, HIP FLEXION, HIP ABDUCTION, HIP EXTENSION, AN

## 2024-10-29 SDOH — HEALTH STABILITY: PHYSICAL HEALTH: PHYSICAL EXERCISE: SUPINE, SITTING, STANDING

## 2024-10-29 SDOH — ECONOMIC STABILITY: HOUSING INSECURITY
HOME SAFETY: THERAPIST DISCUSSED WITH PATIENT AND CAREGIVER IMPORTANCE OF HOME SAFETY, SPECIFICALLY FOR CLUTTER FREE WALKING PATHWAYS AND ADEQUATE LIGHTING

## 2024-10-29 ASSESSMENT — ACTIVITIES OF DAILY LIVING (ADL)
AMBULATION ASSISTANCE: STAND BY ASSIST
PHYSICAL TRANSFERS ASSESSED: 1
CURRENT_FUNCTION: INDEPENDENT
AMBULATION ASSISTANCE ON FLAT SURFACES: 1
AMBULATION ASSISTANCE: ONE PERSON
AMBULATION ASSISTANCE: 1

## 2024-10-29 ASSESSMENT — ENCOUNTER SYMPTOMS
LIMITED RANGE OF MOTION: 1
DEPRESSION: 0
DENIES PAIN: 1
LOSS OF SENSATION IN FEET: 0
ARTHRALGIAS: 1
OCCASIONAL FEELINGS OF UNSTEADINESS: 1
PAIN: NO PAIN REPORTED
MUSCLE WEAKNESS: 1

## 2024-10-31 ENCOUNTER — HOME CARE VISIT (OUTPATIENT)
Dept: HOME HEALTH SERVICES | Facility: HOME HEALTH | Age: 69
End: 2024-10-31
Payer: MEDICARE

## 2024-10-31 VITALS
RESPIRATION RATE: 18 BRPM | DIASTOLIC BLOOD PRESSURE: 72 MMHG | TEMPERATURE: 97.9 F | OXYGEN SATURATION: 98 % | SYSTOLIC BLOOD PRESSURE: 128 MMHG | HEART RATE: 72 BPM

## 2024-10-31 PROCEDURE — G0299 HHS/HOSPICE OF RN EA 15 MIN: HCPCS | Mod: HHH

## 2024-10-31 SDOH — ECONOMIC STABILITY: GENERAL

## 2024-10-31 ASSESSMENT — ENCOUNTER SYMPTOMS
LAST BOWEL MOVEMENT: 67144
DENIES PAIN: 1
PERSON REPORTING PAIN: PATIENT
APPETITE LEVEL: GOOD

## 2024-10-31 ASSESSMENT — ACTIVITIES OF DAILY LIVING (ADL): MONEY MANAGEMENT (EXPENSES/BILLS): NEEDS ASSISTANCE

## 2024-11-01 ENCOUNTER — HOME CARE VISIT (OUTPATIENT)
Dept: HOME HEALTH SERVICES | Facility: HOME HEALTH | Age: 69
End: 2024-11-01
Payer: MEDICARE

## 2024-11-04 ENCOUNTER — HOME CARE VISIT (OUTPATIENT)
Dept: HOME HEALTH SERVICES | Facility: HOME HEALTH | Age: 69
End: 2024-11-04
Payer: MEDICARE

## 2024-11-04 VITALS
HEART RATE: 78 BPM | DIASTOLIC BLOOD PRESSURE: 60 MMHG | SYSTOLIC BLOOD PRESSURE: 110 MMHG | RESPIRATION RATE: 20 BRPM | TEMPERATURE: 98.7 F | OXYGEN SATURATION: 100 %

## 2024-11-04 PROCEDURE — G0299 HHS/HOSPICE OF RN EA 15 MIN: HCPCS | Mod: HHH

## 2024-11-05 ENCOUNTER — HOME CARE VISIT (OUTPATIENT)
Dept: HOME HEALTH SERVICES | Facility: HOME HEALTH | Age: 69
End: 2024-11-05
Payer: MEDICARE

## 2024-11-05 PROCEDURE — G0151 HHCP-SERV OF PT,EA 15 MIN: HCPCS | Mod: HHH

## 2024-11-05 SDOH — HEALTH STABILITY: PHYSICAL HEALTH: EXERCISE TYPE: THA PROTOCOL

## 2024-11-05 SDOH — HEALTH STABILITY: PHYSICAL HEALTH: EXERCISE ACTIVITY: THA PROTOCOL

## 2024-11-05 SDOH — HEALTH STABILITY: PHYSICAL HEALTH: PHYSICAL EXERCISE: 15

## 2024-11-05 SDOH — HEALTH STABILITY: PHYSICAL HEALTH: EXERCISE COMMENTS: D MARCHING

## 2024-11-05 SDOH — HEALTH STABILITY: PHYSICAL HEALTH: EXERCISE ACTIVITIES SETS: 2

## 2024-11-05 SDOH — HEALTH STABILITY: PHYSICAL HEALTH: PHYSICAL EXERCISE: SUPINE, SITTING, STANDING

## 2024-11-05 ASSESSMENT — BALANCE ASSESSMENTS
REACHING FORWARD WITH OUTSTRETCHED ARM WHILE STANDING: 4 - CAN REACH FORWARD CONFIDENTLY 25 CM (10 INCHES)
TURN 360 DEGREES: 4 - ABLE TO TURN 360 DEGREES SAFELY IN 4 SECONDS OR LESS
TURN 360 DEGREES: 4
PICK UP OBJECT FROM THE FLOOR FROM A STANDING POSITION: 4 - ABLE TO PICK UP SLIPPER SAFELY AND EASILY
TRANSFERS: 4
STANDING TO SITTING: 4 - SITS SAFELY WITH MINIMAL USE OF HANDS
STANDING UNSUPPORTED: 4
STANDING UNSUPPORTED WITH FEET TOGETHER: 4
TRANSFERS: 4 - ABLE TO TRANSFER SAFELY WITH MINOR USE OF HANDS
STANDING UNSUPPORTED WITH FEET TOGETHER: 4 - ABLE TO PLACE FEET TOGETHER INDEPENDENTLY AND STAND 1 MINUTE SAFELY
SITTING TO STANDING: 4
SITTING TO STANDING: 4 - ABLE TO STAND WITHOUT USING HANDS AND STABILIZE INDEPENDENTLY
LONG VERSION TOTAL SCORE (MAX 56): 53
STANDING TO SITTING: 4
COMMENTS: LOW FALL RISK
STANDING ON ONE LEG: 3
STANDING ON ONE LEG: 3 - ABLE TO LIFT LEG INDEPENDENTLY AND HOLD 5-10 SECONDS
STANDING UNSUPPORTED ONE FOOT IN FRONT: 3 - ABLE TO PLACE FOOT AHEAD INDEPENDENTLY AND HOLD 30 SECONDS
STANDING UNSUPPORTED WITH EYES CLOSED: 4 - ABLE TO STAND 10 SECONDS SAFELY
STANDING UNSUPPORTED ONE FOOT IN FRONT: 3
STANDING UNSUPPORTED WITH EYES CLOSED: 4

## 2024-11-05 ASSESSMENT — ACTIVITIES OF DAILY LIVING (ADL)
HOME_HEALTH_OASIS: 00
AMBULATION ASSISTANCE ON FLAT SURFACES: 1
PHYSICAL TRANSFERS ASSESSED: 1
AMBULATION ASSISTANCE: INDEPENDENT
AMBULATION ASSISTANCE: 1
CURRENT_FUNCTION: INDEPENDENT
OASIS_M1830: 00

## 2024-11-05 ASSESSMENT — ENCOUNTER SYMPTOMS
LIMITED RANGE OF MOTION: 1
ARTHRALGIAS: 1
LOSS OF SENSATION IN FEET: 0
OCCASIONAL FEELINGS OF UNSTEADINESS: 0
DENIES PAIN: 1
DEPRESSION: 0
PAIN: NO PAIN REPORTED
MUSCLE WEAKNESS: 1

## 2024-11-09 SDOH — ECONOMIC STABILITY: FOOD INSECURITY: MEALS PER DAY: 3

## 2024-11-09 ASSESSMENT — ENCOUNTER SYMPTOMS
SUBJECTIVE PAIN PROGRESSION: UNCHANGED
PAIN LOCATION: GENERALIZED
HIGHEST PAIN SEVERITY IN PAST 24 HOURS: 0/10
LOWEST PAIN SEVERITY IN PAST 24 HOURS: 0/10
PAIN: 1
PAIN LOCATION - PAIN SEVERITY: 0/10
MUSCLE WEAKNESS: 1
CHANGE IN APPETITE: UNCHANGED
PAIN LOCATION - PAIN FREQUENCY: INTERMITTENT
APPETITE LEVEL: FAIR
PAIN SEVERITY GOAL: 0/10
PERSON REPORTING PAIN: PATIENT
PAIN LOCATION - PAIN QUALITY: ACHY

## 2024-11-09 ASSESSMENT — ACTIVITIES OF DAILY LIVING (ADL)
AMBULATION ASSISTANCE: STAND BY ASSIST
CURRENT_FUNCTION: STAND BY ASSIST

## 2024-11-21 DIAGNOSIS — Z96.642 STATUS POST TOTAL HIP REPLACEMENT, LEFT: ICD-10-CM

## 2024-11-22 ENCOUNTER — HOSPITAL ENCOUNTER (OUTPATIENT)
Dept: RADIOLOGY | Facility: CLINIC | Age: 69
Discharge: HOME | End: 2024-11-22
Payer: MEDICARE

## 2024-11-22 ENCOUNTER — OFFICE VISIT (OUTPATIENT)
Dept: ORTHOPEDIC SURGERY | Facility: CLINIC | Age: 69
End: 2024-11-22
Payer: MEDICARE

## 2024-11-22 DIAGNOSIS — Z96.642 STATUS POST LEFT HIP REPLACEMENT: Primary | ICD-10-CM

## 2024-11-22 DIAGNOSIS — Z96.642 STATUS POST TOTAL HIP REPLACEMENT, LEFT: ICD-10-CM

## 2024-11-22 PROCEDURE — 73502 X-RAY EXAM HIP UNI 2-3 VIEWS: CPT | Mod: LT

## 2024-11-22 PROCEDURE — 99211 OFF/OP EST MAY X REQ PHY/QHP: CPT | Performed by: ORTHOPAEDIC SURGERY

## 2025-02-18 ENCOUNTER — APPOINTMENT (OUTPATIENT)
Dept: ORTHOPEDIC SURGERY | Facility: CLINIC | Age: 70
End: 2025-02-18
Payer: MEDICARE

## (undated) DEVICE — MARKER, SKIN, REGULAR TIP, W/FLEXI-RULER

## (undated) DEVICE — Device

## (undated) DEVICE — BASIN KIT, SINGLE

## (undated) DEVICE — BANDAGE, COFLEX, 6 X 5 YDS, FOAM TAN, STERILE, LF

## (undated) DEVICE — BANDAGE, ESMARK, 6 IN X 9 FT, STERILE

## (undated) DEVICE — BLADE, SAGITTAL DUAL CUT, 25 X 90 X 1.27

## (undated) DEVICE — DRAPE, TIBURON, SPLIT SHEET, REINF ADH STRIP, 77X108

## (undated) DEVICE — DRAPE, U-DRAPE, NON STERILE

## (undated) DEVICE — TIP, SUCTION, SUPER SUCKER, KAM, MINI, CURVED

## (undated) DEVICE — MAT, AIR TRANSFER, 39X81

## (undated) DEVICE — DRESSING, MEPILEX BORDER, POST-OP AG, 4 X 14 IN

## (undated) DEVICE — BANDAGE, ELASTIC, ACE, ACE, DOUBLE LENGTH, 6 X 550 IN, LF

## (undated) DEVICE — GOWN, SURGICAL, IMPLT, BACK, XLARGE, XLONG, STERILE

## (undated) DEVICE — APPLICATOR, CHLORAPREP, W/ORANGE TINT, 26ML

## (undated) DEVICE — DRAPE, SHEET, THREE QUARTER, FAN FOLD, 57 X 77 IN

## (undated) DEVICE — GOWN, ASTOUND, XL

## (undated) DEVICE — SPONGE, LAP, XRAY DECT, 18IN X 18IN, W/MASTER DMT, STERILE

## (undated) DEVICE — DRAPE, TIBURON, HIP W/POUCHES

## (undated) DEVICE — STRIP, SKIN CLOSURE, STERI STRIP, REINFORCED, 0.5 X 4 IN

## (undated) DEVICE — RETRIEVER, SUTURE, HEWSON

## (undated) DEVICE — WOUND SYSTEM, DEBRIDEMENT & CLEANING, O.R DUOPAK

## (undated) DEVICE — BLADE, SAW, SAGITTAL, 12.5 X 81.5 X 1.27 MM, STAINLESS STEEL, STERILE

## (undated) DEVICE — SLEEVE, VASO PRESS, CALF GARMENT, MEDIUM, GREEN

## (undated) DEVICE — CEMENT, MIXEVAC III, 10S BOWL, KNEES

## (undated) DEVICE — PADDING, CAST, WYTEX, 6 IN X 4 YD, LF

## (undated) DEVICE — DRESSING, AQUACEL AG, HYDROFIBER W/SILVER, 3.5 X 12 IN

## (undated) DEVICE — SEALER, BIPOLAR, AQUA MANTYS 6.0

## (undated) DEVICE — DRAPE, SHEET, U, W/ADHESIVE STRIP, IMPERVIOUS, 60 X 70 IN, DISPOSABLE, LF, STERILE